# Patient Record
Sex: MALE | Race: WHITE | NOT HISPANIC OR LATINO | Employment: UNEMPLOYED | URBAN - METROPOLITAN AREA
[De-identification: names, ages, dates, MRNs, and addresses within clinical notes are randomized per-mention and may not be internally consistent; named-entity substitution may affect disease eponyms.]

---

## 2018-05-28 ENCOUNTER — CLINICAL SUPPORT (OUTPATIENT)
Dept: REHABILITATION | Facility: OTHER | Age: 48
End: 2018-05-28
Attending: PHYSICAL MEDICINE & REHABILITATION
Payer: COMMERCIAL

## 2018-05-28 VITALS
BODY MASS INDEX: 24.25 KG/M2 | SYSTOLIC BLOOD PRESSURE: 116 MMHG | DIASTOLIC BLOOD PRESSURE: 79 MMHG | WEIGHT: 169.38 LBS | HEART RATE: 70 BPM | HEIGHT: 70 IN

## 2018-05-28 DIAGNOSIS — M54.50 CHRONIC BILATERAL LOW BACK PAIN WITHOUT SCIATICA: Primary | ICD-10-CM

## 2018-05-28 DIAGNOSIS — G89.29 CHRONIC BILATERAL LOW BACK PAIN WITHOUT SCIATICA: Primary | ICD-10-CM

## 2018-05-28 PROCEDURE — 99203 OFFICE O/P NEW LOW 30 MIN: CPT | Mod: ,,, | Performed by: PHYSICAL MEDICINE & REHABILITATION

## 2018-05-28 RX ORDER — CHOLECALCIFEROL (VITAMIN D3) 25 MCG
1000 TABLET ORAL DAILY
COMMUNITY

## 2018-05-28 NOTE — PROGRESS NOTES
Health  met with patient to complete initial outcomes for the Healthy Back lumbar program.  Questions were reviewed with patient and discussed with Dr. Head. The patient will meet with Dr. Head to determine program enrollment.      Any changes to patient responses are below in red font.     HealthyBack Questionnaire  5/28/2018   Please select the location of your worst pain:  Low back   Please select the location of any additional pain: (hold down the control key, and click to select multiple responses) None of the above   Did any event trigger your pain?  No   How long has this pain been going on?  1.5 years   Please indicate how the pain is changing.  Worsening   What is the WORST level of pain that you have experienced in the last week?  5   What is the LEAST level of pain that you have experienced in the past week?  0   What can you NOT do not that you used to be able to do?  N/A   Are your limitations mainly due to your pain?  No   What are your additional complaints, if any? None   Is there ever a time during the day when your pain stops, even for a brief moment, before returning? Yes   If yes, when your pain stops, does it disappear completely? Is it totally gone? Yes   Does bending forward make your typical pain worse? No   Morning: Worse during   Afternoon: Same   Evening:  Same   Nighttime: Worse during   Standing:  Worse   Lying on stomach: Worse   Lying on back: Better   Sitting:  Better   Walking: Worse   Climbing stairs: Better   In the last seven days, have you had any changes in your bowel and/or bladder habits? No   Have all of your attempts to treat your back/leg pain resulted in failure?  No   Do you believe your doctor(s) do NOT understand how much pain you have?  No   Do you believe that you have one or more conditions that have not been diagnosed by your doctor(s)?  No   Do you believe that you deserve more understanding from others including your family than you are getting?  No    Do you feel relatively calm about how your back/leg pain has impacted your life?  Yes   Are you OK with treatment taking a very long time (even years) before you feel relief from your back/leg pain?  Yes   Do you believe that your pain has caused you to be more forgetful?  No   Do you feel that you have not received enough treatment for your pain?  Yes   Do you believe that your doctor(s) do not have the right training to treat your back/leg pain effectively?  No   Do you believe you should not be allowed to work or attend school because of your back/leg pain?  No   When did this pain begin?  1.5 years ago   Did the pain begin after an injury or an accident? No   Is the pain work related?  No   Please nessa which of the following over-the-counter medicines you take. (hold down the control key, and click to select multiple responses) None   Please nessa which of the following prescription medicines you take. (hold down the control key, and click to select multiple responses) None   Emergency department  No   Health care providers (hold down the control key, and click to select multiple responses) Family doctor   Investigations done (hold down the control key, and click to select multiple responses) None   Procedures (hold down the control key, and click to select multiple responses) None   Emergency department  No   Health care providers (hold down the control key, and click to select multiple responses) Family doctor   Investigations done (hold down the control key, and click to select multiple responses) None   Procedures (hold down the control key, and click to select multiple responses) None   Have you had any surgery on your back?  No   Please nessa what you are experiencing. (hold down the control key, and click to select multiple responses) None   First activity you would like to perform better:  Getting up from sitting   Current ability score to perform first activity: 7   Second activity you would like to  perform better: Getting up from flexed position   Current ability score to perform second activity: 7   Third activity you would like to perform better: Posture   Current ability score to perform third activity: 5   Pain intensity:  The pain comes and goes and is moderate.   Personal care (washing, dressing, etc.):  I do not normally change my way of washing or dressing even though it causes some pain.   Lifting: I can lift heavy weights without pain.   Walking: I cannot walk more than one mile without increasing pain.   Sitting: I can sit in any chair as long as I like.   Standing: Pain prevents me from standing more than one hour.   Sleeping: I get pain in bed, but it does not prevent me from sleeping well.   Social life: My social life is normal but increases the degree of pain.   Traveling: I get no pain while traveling.   Changing degree of pain: My pain is neither getting better nor worse.   Do you need any help looking after yourself? I need no help at all.   When doing household tasks, e.g., preparing food, gardening, using the video recorder, radio, telephone, or washing the car: I need no help at all.   Thinking about how easily you can get around your home and community: I get around my home and community by myself without any difficulty.   Because of your health, your relationships, e.g., your friends, partner or parents, generally: Are very close and warm   Thinking about your relationships with other people: I have plenty of friends and am never lonely.   Thinking about your health and your relationship with your  family:  My role in the family is unaffected by my health.   Thinking about your vision, including when using your glasses or contact lenses if needed: I see normally.   Thinking about your hearing, including using your hearing aid if needed: I have some difficulty hearing, or I do not hear clearly, e.g., I ask people to speak up or turn up the TV radio volume.   When you communicate with  others, e.g., talking, listening, writing, or signing: I have no trouble speaking to them or understanding what they are saying.   Thinking about how you sleep: I am able to sleep without difficulty most of the time.   Thinking about how you generally feel: I do not feel anxious, worried or depressed.   How much pain or discomfort do you experience? I have moderate pain.   Little interest or pleasure in doing things Not at all   Feeling down, depressed or hopeless Not at all   What is your occupation?  [employer]   How do you spend your leisure time? What are your hobbies? See live music  and play tennis   How do you spend your leisure time? What are your hobbies? See live music  and play tennis   What is your highest level of education? Advanced/graduate   What is your work status? Employed   How did you hear about the Healthyback program?  Patient referral   When did this pain begin?  1.5 years ago   Do you need any help looking after yourself? I need no help at all.   When doing household tasks, e.g., preparing food, gardening, using the video recorder, radio, telephone, or washing the car: I need no help at all.   Thinking about how easily you can get around your home and community: I get around my home and community by myself without any difficulty.   Because of your health, your relationships, e.g., your friends, partner or parents, generally: Are very close and warm   Thinking about your relationships with other people: I have plenty of friends and am never lonely.   Thinking about your health and your relationship with your  family:  My role in the family is unaffected by my health.   Thinking about your vision, including when using your glasses or contact lenses if needed: I see normally.   Thinking about your hearing, including using your hearing aid if needed: I have some difficulty hearing, or I do not hear clearly, e.g., I ask people to speak up or turn up the TV radio volume.   When you  communicate with others, e.g., talking, listening, writing, or signing: I have no trouble speaking to them or understanding what they are saying.   Thinking about how you sleep: I am able to sleep without difficulty most of the time.   Thinking about how you generally feel: I do not feel anxious, worried or depressed.   How much pain or discomfort do you experience? I have moderate pain.   Little interest or pleasure in doing things Not at all   Feeling down, depressed or hopeless Not at all

## 2018-05-28 NOTE — PROGRESS NOTES
Subjective:      Patient ID: Arely Haro is a 48 y.o. male.    Chief Complaint: No chief complaint on file.    Referred by: No ref. provider found     Mr Haro is a 47 yo male here  for evaluation for the healthy back lumbar program.  He has had low back pain for 1.5 years, but worse the past month.  There was no event that caused the pain, it got worse after standing and dancing at [employer].  The pain is right low back dominant, and goes to the left.  There is no numbness, tingling, burning, or weakness.  No leg pain, just tightness in quads and glutes.  The pain is intermittent ranging from 0-5/10.  The pain is an achy soreness It is worse with coming up from bending, getting up from sitting, changing position, standing, lying on stomach, walking, morning.  It is better with bending, sleeping with knees bent, lying on back, sitting, stairs, and stretching.  He has never had any treatment for his back: no chiropractor, PT, injections, or surgery.  His goals are getting up from sitting, getting up from flexed position, posture.  Pattern 1      Past Medical History:  No date: Asthma  No date: Hemochromatosis    History reviewed. No pertinent surgical history.    Review of patient's family history indicates:  Problem: Arthritis      Relation: Mother       Age of Onset: (Not Specified)   Problem: Skin cancer      Relation: Mother       Age of Onset: (Not Specified)   Problem: Cancer      Relation: Father       Age of Onset: (Not Specified)   Problem: Heart disease      Relation: Father       Age of Onset: (Not Specified)   Problem: Hypertension      Relation: Father       Age of Onset: (Not Specified)       Social History    Marital status:              Spouse name:                       Years of education:                 Number of children:               Social History Main Topics    Smoking status: Never Smoker                                                                Smokeless tobacco: Never Used                         Drug use: Yes                Types: Marijuana      Current Outpatient Prescriptions:  multivitamin (THERAGRAN) tablet, Take 1 tablet by mouth once daily., Disp: , Rfl:   vitamin D 1000 units Tab, Take 1,000 Units by mouth once daily., Disp: , Rfl:     No current facility-administered medications for this visit.       Review of patient's allergies indicates:  No Known Allergies                  Review of Systems   Constitution: Negative for weight gain and weight loss.   Cardiovascular: Negative for chest pain.   Respiratory: Negative for shortness of breath.    Musculoskeletal: Positive for back pain (right). Negative for joint pain and joint swelling.   Gastrointestinal: Negative for abdominal pain and bowel incontinence.   Genitourinary: Negative for bladder incontinence.   Neurological: Negative for numbness.           Objective:          General    Vitals reviewed.  Constitutional: He is oriented to person, place, and time. He appears well-developed and well-nourished.   HENT:   Head: Normocephalic and atraumatic.   Pulmonary/Chest: Effort normal.   Neurological: He is alert and oriented to person, place, and time.   Psychiatric: He has a normal mood and affect. His behavior is normal. Judgment and thought content normal.     General Musculoskeletal Exam   Gait: normal     Right Ankle/Foot Exam     Tests   Heel Walk: able to perform  Tiptoe Walk: able to perform    Left Ankle/Foot Exam     Tests   Heel Walk: able to perform  Tiptoe Walk: able to perform  Back (L-Spine & T-Spine) / Neck (C-Spine) Exam     Tenderness Right paramedian tenderness of the Sacrum.     Back (L-Spine & T-Spine) Range of Motion   Extension: 20   Flexion: 90   Lateral Bend Right: 20   Lateral Bend Left: 20   Rotation Right: 40   Rotation Left: 40     Spinal Sensation   Right Side Sensation  C-Spine Level: normal   L-Spine Level: normal  S-Spine Level: normal  Left Side Sensation  C-Spine Level: normal  L-Spine Level:  normal  S-Spine Level: normal    Back (L-Spine & T-Spine) Tests   Right Side Tests  Straight leg raise:      Sitting SLR: > 70 degrees      Left Side Tests  Straight leg raise:     Sitting SLR: > 70 degrees          Other He has no scoliosis .  Spinal Kyphosis:  Absent    Comments:  Neg DONNA      Muscle Strength   Right Upper Extremity   Biceps: 5/5/5   Deltoid:  5/5  Triceps:  5/5  Wrist Extension: 5/5/5   Finger Flexors:  5/5  Left Upper Extremity  Biceps: 5/5/5   Deltoid:  5/5  Triceps:  5/5  Wrist Extension: 5/5/5   Finger Flexors:  5/5  Right Lower Extremity   Hip Flexion: 5/5   Quadriceps:  5/5   Anterior tibial:  5/5/5  EHL:  5/5  Left Lower Extremity   Hip Flexion: 5/5   Quadriceps:  5/5   Anterior tibial:  5/5/5   EHL:  5/5    Reflexes     Left Side  Biceps:  2+  Triceps:  2+  Brachioradialis:  2+  Quadriceps:  2+  Achilles:  2+  Left Talamantes's Sign:  Absent  Babinski Sign:  absent    Right Side   Biceps:  2+  Triceps:  2+  Brachioradialis:  2+  Quadriceps:  2+  Achilles:  2+  Right Talamantes's Sign:  absent  Babinski Sign:  absent    Vascular Exam     Right Pulses        Carotid:                  2+    Left Pulses        Carotid:                  2+        Assessment:       Encounter Diagnosis   Name Primary?    Chronic bilateral low back pain without sciatica Yes         Plan:       Diagnoses and all orders for this visit:    Chronic bilateral low back pain without sciatica  -     Ambulatory consult to [facility]         The patient has had a history of low back pain with limitations in there activities of Daily living    The situation was discussed at length with the patient.  More than 50% of the total time of 45 minutes was spent in counseling.  We discussed different causes of back pain and different treatment options.  We discussed the importance of stretching and strengthening to help maintain normal activity.  He likes to play tennis.  We discussed posture and trying to maintain the  normal curve of the spine.  We discussed the pros and cons of further diagnostic testing, alternative treatment and Medications.     Based on the history, physical exam, and functional index, an active physical therapy program is recommended.  The goal is to restore the patients function and reduce pain.  A program of progressive resistance exercises, biomechanical, and mobility maneuvers, instructions in proper body mechanics, aerobic conditioning and HEP will be utilized. The program will continue as long as making improvements.    An assessment of patients progress will be made at each visit to document change in status.    The patient will be actively involved in there own treatment, and responsible for appointments and home program    The patient's lumbar isometric strength will be tested and they will be placed in a program of isolated strength training based on 50% of their total functional torque and advanced as clinically appropriate.      Directional preference of pain will further influence the patients active rehabilitation program    The patient was instructed there might be an initial increase in discomfort    They are enrolled with good prognosis  Pattern 1  He will be away for 5 weeks, we will get eval in before he goes and then continue

## 2018-06-01 ENCOUNTER — CLINICAL SUPPORT (OUTPATIENT)
Dept: REHABILITATION | Facility: OTHER | Age: 48
End: 2018-06-01
Attending: PHYSICAL MEDICINE & REHABILITATION
Payer: COMMERCIAL

## 2018-06-01 DIAGNOSIS — M54.50 CHRONIC BILATERAL LOW BACK PAIN WITHOUT SCIATICA: ICD-10-CM

## 2018-06-01 DIAGNOSIS — G89.29 CHRONIC BILATERAL LOW BACK PAIN WITHOUT SCIATICA: ICD-10-CM

## 2018-06-01 PROCEDURE — 97110 THERAPEUTIC EXERCISES: CPT | Performed by: PHYSICAL MEDICINE & REHABILITATION

## 2018-06-01 PROCEDURE — 97161 PT EVAL LOW COMPLEX 20 MIN: CPT | Performed by: PHYSICAL MEDICINE & REHABILITATION

## 2018-06-01 NOTE — PATIENT INSTRUCTIONS
Top 10 tips for back and neck pain    The spine is the pillar of the body, providing the foundation for the upper and lower extremities to attach.  Our spines withstand significant forces all day long.  There are many ways in which we can take care of our backs.  Here are a couple tips to help you keep your back in action.    1. Watch your posture in sitting.     Sit in chairs with back supports, and use a lumbar roll to maintain the normal curve of your low back. Ensure the height of your chair is such that your feet rest flat on the floor with your knees and hips level.  The average American sits 9 hours a day.  Do not sit longer than 1 hour without getting up to stretch or move.     2. Watch your posture in standing.   Maintain the normal curves of your spine in standing.   When standing tall, you should be able to draw a line down through your ear, shoulder, hip, and ankle.  Wear good shoes and consider using a standing desk mat if you stand a lot during work.  Take breaks from standing.    3. Lift correctly   Lift objects by using the strong muscles in your legs.  Get close to the object, bend your knees and your hips, and maintain the normal curve of your low back. Do not twist when lifting or carrying items. Think about the tasks you perform daily at work or home, and minimize lifting and carrying objects.  Use rolling carts or other strategies to reduce back strain.    4. Exercise regularly  Individuals who exercise regularly generally experience better health, reduced back pain, and less stress.  A good exercise program has a stretching component, a strengthening component, and an aerobic component.   Maintain the mobility of your spine by stretching daily. Strengthen your core and extremities several times a week.   Get regular cardiovascular exercise, 3-5/week.  Choose activities you like such as walking, swimming, dancing, or riding a bike.     5. Quit Smoking  Smoking increases the likelihood of back  pain.  It is thought that smoking reduces the blood supply to the discs between the vertebrae and this may lead to degeneration of these discs.  Talk to your Physician about quitting.  There are many smoking cessation options that may work for you.    6. Keep moving even when you have pain  Motion is lotion.   The majority of back pain is mechanical in nature, and will likely reduce with gentle movements, stretching, and walking.  As tempting as it may be to stay in bed when you are hurting, remember that you will likely feel better by getting up and gently moving and walking.  Limit bed rest.      7. Maintain a healthy diet  Try to maintain a healthy diet and weight.        8. Stay hydrated  The average adult is approximately 60 % water.  Staying hydrated is beneficial for all aspects of health.  In general, an adult should drink half of their body weight in ounces.  For example, if you weight 180 lbs, you should drink 90 ounces of water daily.     9. Get regular sleep   Ensure that you get a good nights rest on a regular basis. The discs in your spine hydrate when you lie down to sleep. Your spine needs the rest too.     10. See Your Physician    Make an appointment to see your Physician for back pain that is progressively worsening, and for back pain that is no better or worse with changing positions and activities.        Z LIE POSITION  Z Lie is a position that you can use to unload your back and assist with pain reduction.  Lie on your back and rest your calves on the seat on a chair or a bench.  Viewed from the side, you should resemble a Z.  Your therapist may suggest sliding the chair closer to you, so your knees are over your stomach.   Your therapist may also suggest a pillow under your buttock if needed.  Follow the directions from your therapist.  The goal of this position is to reduce your symptoms.    Z lie can be done in a variety of ways.  It can be done on a bed resting your legs on a light  and easy to lift chair                  Goal:  - reduce back /thigh/buttock pain with mechanical movements or positions that relieve pressure on structures in low back  - exercises done to reduce symptoms when you experience them  - exercises done to prevent symptoms, when you are feeling good      1. Exercises  A. Supine Knee-to-Chest, Bilateral    Lie on back, hands clasped behind both knees. Pull knees in toward chest until a comfortable stretch is felt in lower back and buttocks. Hold 3 seconds.  Repeat 10 times per session. Do 3 sessions per day.          C. Flexion in Kneeling    Kneel, sitting on heels, arms forward on floor. Push chest toward floor, reaching forward as far as possible. Hold 3 seconds.  Repeat 10 times per session. Do 2 sessions per day.        D. Flexors Stretch, Standing    Stand about a thigh's length from table.  edges of table. Bend knees until stretch is felt under shoulder blades in chest. Hold 3 seconds.  Repeat 10 times per session. Do 2 sessions per day.        E. Pelvic Tilt    lying supine, flatten back by tightening stomach muscles and rolling pelvis back.   Your back will flatten, your buttock will lift up.   Do not hold your breath.  Hold 3 seconds.  Repeat 10 times per set. Do 1 sets per session. Do 3 sessions per day.    [email or web address]            F.  Back Exercises: Back Press    Do this exercise on your hands and knees. Keep your knees under your hips and your hands under your shoulders. Keep your spine in a neutral position (not arched or sagging). Be sure to maintain your necks natural curve:  · Tighten your stomach and buttock muscles to press your back upward. Let your head drop slightly.  · Hold for 5 seconds. Return to starting position.  · Repeat 5 times.  Date Last Reviewed: 10/11/2015  © 9317-8976 [employer]. [address] [place] [address], [place] [address]. All rights reserved. This information is not intended as a substitute for professional  medical care. Always follow your healthcare professional's instructions.            Positioning - Z LIE  Decompression Exercise: Leg Support.  Can be done on floor or bed with legs on couch, therapy ball, chair, or auto man        Lie on back on firm surface arms turned up, out to sides, backs of hands down. Support under legs: 90/90 position. Time 10 minutes.  Surface: floor      Copyright © I. All rights reserved.

## 2018-06-01 NOTE — PLAN OF CARE
[facility] - PHYSICAL THERAPY EVALUATION     Patient   Name: Arely Haro  Clinic Number: [id number]    Arely is a 48 y.o. male evaluated on 06/01/2018.   Time In: 8:00 am  Time out: 9:30 am    Diagnosis:   Encounter Diagnosis   Name Primary?    Chronic bilateral low back pain without sciatica      Physician: Magdalena Head, *  Treatment Orders: PT Eval and Treat    Past Medical History:   Diagnosis Date    Asthma     Hemochromatosis      Current Outpatient Prescriptions   Medication Sig    multivitamin (THERAGRAN) tablet Take 1 tablet by mouth once daily.    vitamin D 1000 units Tab Take 1,000 Units by mouth once daily.     No current facility-administered medications for this visit.      Review of patient's allergies indicates:  No Known Allergies    Precautions: standard       Visit # authorized: 20  Authorization period: 12/31/18  Plan of care Expiration: sent 6/1/18    PATTERN: 1 PEN    Date of eval:  6/1/18  Assessment due:  7/1/18      HISTORY   History of Present Illness: Mr Haro is a 49 yo male here  for evaluation for the healthy back lumbar program.  He has had low back pain for 1.5 years, but worse the past month.    He has back pain most of the time in some capacity, but he has periods where it is worse.  He has pain daily, but intermittent.   It is also not really severe.  There was no event that caused the pain, it got worse after standing and dancing at [facility].  The pain is right low back dominant, and goes to the left.  There is no numbness, tingling, burning, or weakness.  No leg pain, just tightness in quads and glutes.  The pain is intermittent ranging from 0-5/10.  He has never had any treatment for his back: no chiropractor, PT, injections, or surgery.  His goals are getting up from sitting, getting up from flexed position, posture.  Pattern 1         Aggravating factors: standing too long, standing > 30-60 min, sitting during day at work, getting up from sitting,lying  on belly, child's pose  Easing Factors: in motion, feels good walking and playing tennis, lying on side  Disturbed Sleep: no      Pain Scale: Arely rates pain on a scale of 0-10 to be 6 at worst; 2 currently; 0 at best using VAS.   Pain location: low back         Pattern of pain questions:  1.  Where is your pain the worst? back  2.  Is your pain constant or intermittent? intermittent  3.  Does bending forward make your typical pain worse? Yes but standing is the worst  4.  Since the start of your back pain, has there been a change in your bowel or bladder? no  5.  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,     Red Flag Screening:   Cough  Sneeze  Strain: neg  Bladder/ bowel: neg  Falls: no  General health: good  Night pain: neg  Unexplained weight loss: neg    OBJECTIVE     POSTURE  Posture Alignment :poor posture, sits slouched with head forward  Postural examination/scapula alignment:rounded shoulders, head forward  Standing: fair lordosis  Correction of posture: better with lumbar roll    MOVEMENT LOSS    ROM Loss   Flexion minimal loss   Extension minimal loss   Side bending Right minimal loss   Side bending Left minimal loss   Rotation Right minimal loss   Rotation Left minimal loss     Lower Extremity Strength  Right LE  Left LE    Hip flexion: 5/5 Hip flexion: 5/5   Hip extension:  5/5 Hip extension: 5/5   Hip abduction: 5/5 Hip abduction: 5/5   Knee Flexion 5/5 Knee Flexion 5/5   Knee Extension 5/5 Knee Extension 5/5   Ankle dorsiflexion: 5/5 Ankle dorsiflexion: 5/5   Ankle plantarflexion: 5/5 Ankle plantarflexion: 5/5       GAIT:  amb without aid      Special Tests:    Test Name  Test Result   Prone Instability Test (+)   SI Joint Provocation Test (--)   Straight Leg Raise (--)   Neural Tension Test (--)   Crossed Straight Leg Raise (--)   Walking on toes able   Walking on heels  able     (-) alex's  (-) hip quadrant tests  Fair hip mobility          NEUROLOGICAL SCREENING     Sensory deficit: intact including saddle sensation    Reflexes:    Left Right   Patella Tendon 1+ 1+   Achilles Tendon 1+ 1+   Babinski  (--) (--)   Clonus (--) (--)     REPEATED TEST MOVEMENTS:back pain  Repeated Flexion in Standing better   Repeated Extension in Standing end range pain  no worse   Repeated Flexion in lying better   Repeated Extension in lying  end range pain  worse         Baseline Isometric Testing on Med X equipment: Testing administered by PT  [facility] 2018   Visit Number 1   VAS Pain Rating 2   Flexion in Lying 10   Lumbar Extension Seat Pad 0   Femur Restraint 4   Top Dead Center 24   Counterweight 195   Lumbar Flexion 48   Lumbar Extension 0   Lumbar Peak Torque 114   Min Torque 54   Percent From Norm 59   Ice - Z Lie (in min.) 10         Baseline IM Testing Results:   Date of testin18  ROM 0-48 deg   Max Peak Torque 114    Min Peak Torque 54    Flex/Ext Ratio 2.3/1   % below normative data -59%   Counter weight 195   Femur number 4   Seat pad 0           FOTO: Focus on Therapeutic Outcomes   Category: lumbar   % Impaired: 29%  Current Score  = CJ = at least 20% but < 40% impaired, limited or restricted  Goal at Discharge Score = CJ = at least 20% but < 40% impaired, limited or restricted    Score interpretation is as follows:     TEST SCORE  Modifier  Impairment Limitation Restriction    050  CH  0 % impaired, limited or restricted   1-950  CI  @ least 1% but less than 20% impaired, limited or restricted   10-50  CJ  @ least 20%<40% impaired, limited or restricted   20-29/50  CK  @ least 40%<60% impaired, limited or restricted   30-39/50  CL  @ least  "60% <80% impaired, limited or restricted   40-49/50  CM  @ least 80%<100% impaired limited or restricted   50/50  CN  100% impaired, limited or restricted       Treatment   Time In: 9:00 am  Time Out: 9:30 am    PT Evaluation Completed? Yes  Discussed Plan of Care with patient: Yes      Written Home Exercises Provided:   Handouts were given to the patient. Pt demo good understanding of the education provided. Arely demonstrated good return demonstration of activities.        Pt was instructed in and performed the following:   Cardiovascular exercise and therapeutic exercise to improve posture, lumbar/cervical ROM, strength, and muscular endurance as follows:     Arely received therapeutic exercises to develop/improve posture, lumbar/cervical ROM, strength and muscular endurance for 30 minutes including the following exercises:   Med ex warm up  Med ex testing  Education regarding posture  HEP performed      HEP started as follows:  -handouts given regarding back care, with information regarding posture, body mech, ergonomics, and components of good exercise program  -Patient received education regarding proper posture and body mechanics.  Amparo roll tried, recommended, and purchase information was provided.  -discussed concept of developing "tool box" or "strategies, using positions or exercises to reduce symptoms.  Discussed using these tools to reduce symptoms, and also to prevent symptoms if able.      --gave top 10 tips handouts on back and neck care and discusses sitting posture, use of lumbar roll, standing  Posture, correct lifting techniques, need to exercise and encouraged walking, drinking water, healthy diet, regular sleep    HEP as follows:  Pelvic tilts 10 reps 3/day  Flexion in lie 10 reps 3/day  QL stretch 5 reps 10 sec 2/day  Ham stretch 10 reps 5 sec before tennis  Calf stretch 10 reps               - Patient received a handout regarding anticipated muscular soreness following the isometric test " and strategies for management were reviewed with patient including stretching, using ice and scheduled rest.         Assessment   This is a 48 y.o. male referred to [facility] and presents with a medical diagnosis of   Encounter Diagnosis   Name Primary?    Chronic bilateral low back pain without sciatica     and demonstrates limitations as described below in the problem list. Pt rehab potential is Good. Pt presents with back pain, reduced back strength and reduced back mobility, poor ergonomics sitting at computer, pain with standing for events,    Pain Pattern: 1 PEN       Patient received education on the Healthy Back program, purpose of the isometric test, progression of back strengthening as well as wellness approach and systemic strengthening.  Details of the program were discussed.  Reviewed that patient should feel support/pressure from med ex restraints but no pain or discomfort and patient expressed understanding.    Based on the above history and physical examination an active physical therapy program is recommended.  Pt will continue to benefit from skilled outpatient physical therapy to address the deficits listed below in the chart, provide pt/family education and to maximize pt's level of independence in the home and community environment. .     No environmental, cultural, spiritual, developmental or education needs expressed or noted    Medical necessity is demonstrated by the following problem list.    Pt presents with the following impairments:   History  Co-morbidities and personal factors that may impact the plan of care Examination  Body Structures and Functions, activity limitations and participation restrictions that may impact the plan of care Clinical Presentation   Decision Making/ Complexity Score   Co-morbidities:     Chronic pain    Personal Factors:    Body Regions:   back  lower extremities    Body Systems:   gross symmetry  ROM  strength  gross coordinated  movement  transitions  motor control  motor learning    Activity limitations:   Learning and applying knowledge  no deficits    General Tasks and Commands  no deficits    Communication  no deficits    Mobility  using transportation (bus, train, plane, car)    Self care  looking after one's health    Domestic Life  doing house work (cleaning house, washing dishes, laundry)    Interactions/Relationships  no deficits    Life Areas  no deficits    Community and Social Life  no deficits    Participation Restrictions:   Reduced ability to travel and attend concerts     stable and uncomplicated   low         GOALS: Pt is in agreement with the following goals.    Short term goals:  6 weeks or 10 visits   1.  Pt will demonstrate increased lumbar ROM by at least 3 degrees from the initial ROM value with improvements noted in functional ROM and ability to perform ADLs  2.  Pt will demonstrate increased improved lumbar strength on testing by 10 % with improvements functionally noted with standing/walking/lifting groceries, self care  3.  Patient report a reduction in worst pain score by 1-2 points for improved tolerance during work and recreational activities  4.  Pt able to perform HEP correctly with minimal cueing or supervision for therapist      Long term goals: 13 weeks or 20 visits   1. Pt will demonstrate increased lumbar ROM by at least 6 degrees from initial ROM value, resulting in improved ability to perform functional fwd bending while standing and sitting.   2. Pt will demonstrate increased maximum isometric torque value by 30% when compared to the initial value resulting in improved ability to perform bending, lifting, and carrying activities safely, confidently.  3. Pt to demonstrate ability to independently control and reduce their pain through posture positioning and mechanical movements throughout a typical day.  4.  Patient will demonstrate improved overall function per FOTO Survey to CJ = at least 20% but <  "40% impaired, limited or restricted score or less.  5. Stand for  Hour concert with min to no pain   6. Tolerate travel well    Plan   Outpatient physical therapy 2x week for 13 weeks or 20 visits to include the following:   - Patient education  - Therapeutic exercise  - Manual therapy  - Performance testing   - Neuromuscular Re-education  - Therapeutic activity   - Modalities    Pt may be seen by PTA as part of the rehabilitation team.     Therapist: Cristina Rubio, PT  6/1/2018    "I certify the need for these services furnished under this plan of treatment and while under my care."    ____________________________________  Physician/Referring Practitioner    _______________  Date of Signature          "

## 2018-08-15 ENCOUNTER — CLINICAL SUPPORT (OUTPATIENT)
Dept: REHABILITATION | Facility: OTHER | Age: 48
End: 2018-08-15
Attending: PHYSICAL MEDICINE & REHABILITATION
Payer: COMMERCIAL

## 2018-08-15 DIAGNOSIS — G89.29 CHRONIC BILATERAL LOW BACK PAIN WITHOUT SCIATICA: ICD-10-CM

## 2018-08-15 DIAGNOSIS — M54.50 CHRONIC BILATERAL LOW BACK PAIN WITHOUT SCIATICA: ICD-10-CM

## 2018-08-15 PROCEDURE — 97110 THERAPEUTIC EXERCISES: CPT

## 2018-08-15 NOTE — PROGRESS NOTES
[facility] Treatment      Name: Arely Haro  Clinic Number: [id number]  Date of Treatment: 08/15/2018   Diagnosis:   Encounter Diagnosis   Name Primary?    Chronic bilateral low back pain without sciatica      Physician: Magdalena Head, *    Pain pattern determined: 1 PEN  Plan of care signed: 18   Time in: 7:35  Time Out: 8:35  Total Treatment time: 30  Precautions: Standard  Visit #: 2     POC due: 18 Resent 08/15/2018      Reassessment due:9/15/18    Subjective   Arely reports no change in symptoms. He has not been present at PT secondary to travelling. He performs HEP stretches every other day.      Patient reports their pain to be 3/10 on a 0-10 scale with 0 being no pain and 10 being the worst pain imaginable.    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,     Objective     Baseline IM Testing Results:   Date of testin18  ROM 0-48 deg   Max Peak Torque 114    Min Peak Torque 54    Flex/Ext Ratio 2.3/1   % below normative data -59%   Counter weight 195   Femur number 4   Seat pad 0          FOTO: Focus on Therapeutic Outcomes   Category: lumbar   % Impaired: 29%  Current Score  = CJ = at least 20% but < 40% impaired, limited or restricted  Goal at Discharge Score = CJ = at least 20% but < 40% impaired, limited or restricted    Treatment    Pt was instructed in and performed the following:     Arely received therapeutic exercises to develop/improved posture, cardiovascular endurance, muscular endurance, lumbar/cervical ROM, strength and muscular endurance for 45 minutes including the following exercises:     [facility] 8/15/2018   Visit Number 2   VAS Pain Rating 3   Recumbent Bike Seat Pos. 15   Time 10   Flexion in Lying 10    Lumbar Extension Seat Pad -   Femur Restraint -   Top Dead Center -   Counterweight -   Lumbar Flexion -   Lumbar Extension -   Lumbar Peak Torque -   Min Torque -   Percent From Norm -   Lumbar Weight 60   Repetitions 20   Rating of Perceived Exertion 3   Ice - Z Lie (in min.) 10     Pelvic tilts 10 reps 3/day  Flexion in lie 10 reps 3/day  QL stretch 5 reps 10 sec 2/day  Ham stretch 10 reps 5 sec (Supine and long sitting with contract/relax technique)   Calf stretch 10 reps     Peripheral muscle strengthening which included 1 set of 15-20 repetitions at a slow, controlled 7 second per rep pace focused on strengthening supporting musculature for improved body mechanics and functional mobility.  Pt and therapist focused on proper form during treatment to ensure optimal strengthening of each targeted muscle group.  Machines were utilized including torso rotation, leg extension, leg curl, chest press, upright row. Tricep extension, bicep curl, leg press, and hip abduction added on third visit.       Arely received the following manual therapy techniques: None       Home Exercise Program as follows:     Pelvic tilts 10 reps 3/day  Flexion in lie 10 reps 3/day  QL stretch 5 reps 10 sec 2/day  Ham stretch 10 reps 5 sec before tennis  Calf stretch 10 reps     Handouts were given to the patient. Pt demo good understanding of the education provided. Arely demonstrated good return demonstration of activities.     Lumbar roll use compliance: Yes  Additional exercises taught this treatment session:   SUpine HS stretch    Assessment     Pt presents to physical therapy for 2nd visit following initial evaluation.  Pt able to tolerate traveling without significant increases in pain with use of lumbar roll. Reviewed HEP exercises. Pt performed with moderrate verbal cues. Pt introduced to Med X lumbar dynamic exercises and peripheral resistance exercises up to upright row.  Pt tolerated Med X lumbar exercise weight of 50% peak  torque  with reported 3/10 [place] Exertion scale. Pt required mild verbal cues to maintian speed to 7 sec per rep. Pt completed all peripheral resistance exercises with no reports of increased symptoms or discomfort.     Patient is making good progress towards established goals.  Pt will continue to benefit from skilled outpatient physical therapy to address the deficits stated in the impairment chart, provide pt/family education and to maximize pt's level of independence in the home and community environment.       Pt's spiritual, cultural and educational needs considered and pt agreeable to plan of care and goals as stated below:     Medical necessity is demonstrated by the following problem list.    Pt presents with the following impairments:   History  Co-morbidities and personal factors that may impact the plan of care Examination  Body Structures and Functions, activity limitations and participation restrictions that may impact the plan of care Clinical Presentation    Decision Making/ Complexity Score   Co-morbidities:      Chronic pain     Personal Factors:     Body Regions:   back  lower extremities     Body Systems:   gross symmetry  ROM  strength  gross coordinated movement  transitions  motor control  motor learning     Activity limitations:   Learning and applying knowledge  no deficits     General Tasks and Commands  no deficits     Communication  no deficits     Mobility  using transportation (bus, train, plane, car)     Self care  looking after one's health     Domestic Life  doing house work (cleaning house, washing dishes, laundry)     Interactions/Relationships  no deficits     Life Areas  no deficits     Community and Social Life  no deficits     Participation Restrictions:   Reduced ability to travel and attend concerts       stable and uncomplicated    low            GOALS: Pt is in agreement with the following goals.     Short term goals:  6 weeks or 10 visits   1.  Pt will demonstrate increased  lumbar ROM by at least 3 degrees from the initial ROM value with improvements noted in functional ROM and ability to perform ADLs  2.  Pt will demonstrate increased improved lumbar strength on testing by 10 % with improvements functionally noted with standing/walking/lifting groceries, self care  3.  Patient report a reduction in worst pain score by 1-2 points for improved tolerance during work and recreational activities  4.  Pt able to perform HEP correctly with minimal cueing or supervision for therapist        Long term goals: 13 weeks or 20 visits   1. Pt will demonstrate increased lumbar ROM by at least 6 degrees from initial ROM value, resulting in improved ability to perform functional fwd bending while standing and sitting.   2. Pt will demonstrate increased maximum isometric torque value by 30% when compared to the initial value resulting in improved ability to perform bending, lifting, and carrying activities safely, confidently.  3. Pt to demonstrate ability to independently control and reduce their pain through posture positioning and mechanical movements throughout a typical day.  4.  Patient will demonstrate improved overall function per FOTO Survey to CJ = at least 20% but < 40% impaired, limited or restricted score or less.  5. Stand for  Hour concert with min to no pain   6. Tolerate travel well Met 8/15/18        Plan   Continue with established Plan of Care towards established PT goals.

## 2018-08-15 NOTE — PLAN OF CARE
[facility]      Name: Arely Haro  Clinic Number: [id number]  Date of Treatment: 08/15/2018   Diagnosis:   Encounter Diagnosis   Name Primary?    Chronic bilateral low back pain without sciatica      Physician: Magdalena Head, *    Pain pattern determined: 1 PEN  Plan of care signed: 18   Time in: 7:35  Time Out: 8:35  Total Treatment time: 30  Precautions: Standard  Visit #: 2     POC due: 18 Resent 08/15/2018      Reassessment due:9/15/18    Subjective   Arely reports no change in symptoms. He has not been present at PT secondary to travelling. He performs HEP stretches every other day.      Patient reports their pain to be 3/10 on a 0-10 scale with 0 being no pain and 10 being the worst pain imaginable.    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,     Objective     Baseline IM Testing Results:   Date of testin18  ROM 0-48 deg   Max Peak Torque 114    Min Peak Torque 54    Flex/Ext Ratio 2.3/1   % below normative data -59%   Counter weight 195   Femur number 4   Seat pad 0          FOTO: Focus on Therapeutic Outcomes   Category: lumbar   % Impaired: 29%  Current Score  = CJ = at least 20% but < 40% impaired, limited or restricted  Goal at Discharge Score = CJ = at least 20% but < 40% impaired, limited or restricted    Treatment    Pt was instructed in and performed the following:     Arely received therapeutic exercises to develop/improved posture, cardiovascular endurance, muscular endurance, lumbar/cervical ROM, strength and muscular endurance for 45 minutes including the following exercises:     [facility] 8/15/2018   Visit Number 2   VAS Pain Rating 3   Recumbent Bike Seat Pos. 15   Time 10   Flexion in Lying 10    Lumbar Extension Seat Pad -   Femur Restraint -   Top Dead Center -   Counterweight -   Lumbar Flexion -   Lumbar Extension -   Lumbar Peak Torque -   Min Torque -   Percent From Norm -   Lumbar Weight 60   Repetitions 20   Rating of Perceived Exertion 3   Ice - Z Lie (in min.) 10     Pelvic tilts 10 reps 3/day  Flexion in lie 10 reps 3/day  QL stretch 5 reps 10 sec 2/day  Ham stretch 10 reps 5 sec (Supine and long sitting with contract/relax technique)   Calf stretch 10 reps     Peripheral muscle strengthening which included 1 set of 15-20 repetitions at a slow, controlled 7 second per rep pace focused on strengthening supporting musculature for improved body mechanics and functional mobility.  Pt and therapist focused on proper form during treatment to ensure optimal strengthening of each targeted muscle group.  Machines were utilized including torso rotation, leg extension, leg curl, chest press, upright row. Tricep extension, bicep curl, leg press, and hip abduction added on third visit.       Arely received the following manual therapy techniques: None       Home Exercise Program as follows:     Pelvic tilts 10 reps 3/day  Flexion in lie 10 reps 3/day  QL stretch 5 reps 10 sec 2/day  Ham stretch 10 reps 5 sec before tennis  Calf stretch 10 reps     Handouts were given to the patient. Pt demo good understanding of the education provided. Arely demonstrated good return demonstration of activities.     Lumbar roll use compliance: Yes  Additional exercises taught this treatment session:   SUpine HS stretch    Assessment     Pt presents to physical therapy for 2nd visit following initial evaluation.  Pt able to tolerate traveling without significant increases in pain with use of lumbar roll. Reviewed HEP exercises. Pt performed with moderrate verbal cues. Pt introduced to Med X lumbar dynamic exercises and peripheral resistance exercises up to upright row.  Pt tolerated Med X lumbar exercise weight of 50% peak  torque  with reported 3/10 Yosi Exertion scale. Pt required mild verbal cues to maintian speed to 7 sec per rep. Pt completed all peripheral resistance exercises with no reports of increased symptoms or discomfort.     Patient is making good progress towards established goals.  Pt will continue to benefit from skilled outpatient physical therapy to address the deficits stated in the impairment chart, provide pt/family education and to maximize pt's level of independence in the home and community environment.       Pt's spiritual, cultural and educational needs considered and pt agreeable to plan of care and goals as stated below:     Medical necessity is demonstrated by the following problem list.    Pt presents with the following impairments:   History  Co-morbidities and personal factors that may impact the plan of care Examination  Body Structures and Functions, activity limitations and participation restrictions that may impact the plan of care Clinical Presentation    Decision Making/ Complexity Score   Co-morbidities:      Chronic pain     Personal Factors:     Body Regions:   back  lower extremities     Body Systems:   gross symmetry  ROM  strength  gross coordinated movement  transitions  motor control  motor learning     Activity limitations:   Learning and applying knowledge  no deficits     General Tasks and Commands  no deficits     Communication  no deficits     Mobility  using transportation (bus, train, plane, car)     Self care  looking after one's health     Domestic Life  doing house work (cleaning house, washing dishes, laundry)     Interactions/Relationships  no deficits     Life Areas  no deficits     Community and Social Life  no deficits     Participation Restrictions:   Reduced ability to travel and attend concerts       stable and uncomplicated    low            GOALS: Pt is in agreement with the following goals.     Short term goals:  6 weeks or 10 visits   1.  Pt will demonstrate increased  lumbar ROM by at least 3 degrees from the initial ROM value with improvements noted in functional ROM and ability to perform ADLs  2.  Pt will demonstrate increased improved lumbar strength on testing by 10 % with improvements functionally noted with standing/walking/lifting groceries, self care  3.  Patient report a reduction in worst pain score by 1-2 points for improved tolerance during work and recreational activities  4.  Pt able to perform HEP correctly with minimal cueing or supervision for therapist        Long term goals: 13 weeks or 20 visits   1. Pt will demonstrate increased lumbar ROM by at least 6 degrees from initial ROM value, resulting in improved ability to perform functional fwd bending while standing and sitting.   2. Pt will demonstrate increased maximum isometric torque value by 30% when compared to the initial value resulting in improved ability to perform bending, lifting, and carrying activities safely, confidently.  3. Pt to demonstrate ability to independently control and reduce their pain through posture positioning and mechanical movements throughout a typical day.  4.  Patient will demonstrate improved overall function per FOTO Survey to CJ = at least 20% but < 40% impaired, limited or restricted score or less.  5. Stand for  Hour concert with min to no pain   6. Tolerate travel well Met 8/15/18        Plan   Continue with established Plan of Care towards established PT goals.

## 2018-08-24 ENCOUNTER — CLINICAL SUPPORT (OUTPATIENT)
Dept: REHABILITATION | Facility: OTHER | Age: 48
End: 2018-08-24
Attending: PHYSICAL MEDICINE & REHABILITATION
Payer: COMMERCIAL

## 2018-08-24 DIAGNOSIS — M54.50 CHRONIC BILATERAL LOW BACK PAIN WITHOUT SCIATICA: ICD-10-CM

## 2018-08-24 DIAGNOSIS — G89.29 CHRONIC BILATERAL LOW BACK PAIN WITHOUT SCIATICA: ICD-10-CM

## 2018-08-24 PROCEDURE — 97110 THERAPEUTIC EXERCISES: CPT

## 2018-08-24 NOTE — PROGRESS NOTES
[facility]      Name: Arely Haro  Clinic Number: [id number]  Date of Treatment: 2018   Diagnosis:   Encounter Diagnosis   Name Primary?    Chronic bilateral low back pain without sciatica      Physician: Magdalena Head, *    Pain pattern determined: Nancy PEN  Plan of care signed: 18   Time in: 2:30  Time Out: 3:30  Total Treatment time: 40  Precautions: Standard  Visit #: 3     POC due: 18 Resent 08/15/2018    Reassessment due:9/15/18    Subjective   Arely reports no change in symptoms. He continues to report a low grade aching of his back. He was not sore after last session.     Patient reports their pain to be 3/10 on a 0-10 scale with 0 being no pain and 10 being the worst pain imaginable.    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,     Objective     Baseline IM Testing Results:   Date of testin18  ROM 0-48 deg   Max Peak Torque 114    Min Peak Torque 54    Flex/Ext Ratio 2.3/1   % below normative data -59%   Counter weight 195   Femur number 4   Seat pad 0          FOTO: Focus on Therapeutic Outcomes   Category: lumbar   % Impaired: 29%  Current Score  = CJ = at least 20% but < 40% impaired, limited or restricted  Goal at Discharge Score = CJ = at least 20% but < 40% impaired, limited or restricted    Treatment    Pt was instructed in and performed the following:     Arely received therapeutic exercises to develop/improved posture, cardiovascular endurance, muscular endurance, lumbar/cervical ROM, strength and muscular endurance for 45 minutes including the following exercises:     [facility] 2018   Visit Number 3   VAS Pain Rating 2   Recumbent Bike Seat Pos. 15   Time 10   Flexion in Lying 10   Lumbar  Extension Seat Pad -   Femur Restraint -   Top Dead Center -   Counterweight -   Lumbar Flexion -   Lumbar Extension -   Lumbar Peak Torque -   Min Torque -   Percent From Norm -   Lumbar Weight 60   Repetitions 20   Rating of Perceived Exertion 3   Ice - Z Lie (in min.) 10       Pelvic tilts 10 reps 3/day  Flexion in lie 10 reps 3/day  QL stretch 5 reps 10 sec 2/day  Ham stretch 10 reps 5 sec (Supine and long sitting with contract/relax technique)   Calf stretch 10 reps     Peripheral muscle strengthening which included 1 set of 15-20 repetitions at a slow, controlled 7 second per rep pace focused on strengthening supporting musculature for improved body mechanics and functional mobility.  Pt and therapist focused on proper form during treatment to ensure optimal strengthening of each targeted muscle group.  Machines were utilized including torso rotation, leg extension, leg curl, chest press, upright row. Tricep extension, bicep curl, leg press, and hip abduction added on third visit.       Arely received the following manual therapy techniques: None       Home Exercise Program as follows:     Pelvic tilts 10 reps 3/day  Flexion in lie 10 reps 3/day  QL stretch 5 reps 10 sec 2/day  Ham stretch 10 reps 5 sec before tennis  Calf stretch 10 reps     Handouts were given to the patient. Pt demo good understanding of the education provided. Arely demonstrated good return demonstration of activities.     Lumbar roll use compliance: Yes  Additional exercises taught this treatment session:   None, HEP review     Assessment     Pt presents with mild low back pain which did not change significantly throughout treatment session.  Pt able to tolerate traveling without significant increases in pain with use of lumbar roll. Reviewed HEP exercises with minimal v/c.  Pt performed with moderrate verbal cues. Pt reports TAD does not give as intense of a stretch as it did when first starting. Pt tolerated Med X lumbar exercise at  previous exercise  with reported 3/10 Yosi Exertion scale. Pt reported mildly increased fatigue when attempting to keep reps at 10-12 second reps. Pt completed all peripheral resistance exercises with no reports of increased symptoms or discomfort.     Patient is making good progress towards established goals.  Pt will continue to benefit from skilled outpatient physical therapy to address the deficits stated in the impairment chart, provide pt/family education and to maximize pt's level of independence in the home and community environment.       Pt's spiritual, cultural and educational needs considered and pt agreeable to plan of care and goals as stated below:     Medical necessity is demonstrated by the following problem list.    Pt presents with the following impairments:   History  Co-morbidities and personal factors that may impact the plan of care Examination  Body Structures and Functions, activity limitations and participation restrictions that may impact the plan of care Clinical Presentation    Decision Making/ Complexity Score   Co-morbidities:      Chronic pain     Personal Factors:     Body Regions:   back  lower extremities     Body Systems:   gross symmetry  ROM  strength  gross coordinated movement  transitions  motor control  motor learning     Activity limitations:   Learning and applying knowledge  no deficits     General Tasks and Commands  no deficits     Communication  no deficits     Mobility  using transportation (bus, train, plane, car)     Self care  looking after one's health     Domestic Life  doing house work (cleaning house, washing dishes, laundry)     Interactions/Relationships  no deficits     Life Areas  no deficits     Community and Social Life  no deficits     Participation Restrictions:   Reduced ability to travel and attend concerts       stable and uncomplicated    low            GOALS: Pt is in agreement with the following goals.     Short term goals:  6 weeks or 10 visits    1.  Pt will demonstrate increased lumbar ROM by at least 3 degrees from the initial ROM value with improvements noted in functional ROM and ability to perform ADLs  2.  Pt will demonstrate increased improved lumbar strength on testing by 10 % with improvements functionally noted with standing/walking/lifting groceries, self care  3.  Patient report a reduction in worst pain score by 1-2 points for improved tolerance during work and recreational activities  4.  Pt able to perform HEP correctly with minimal cueing or supervision for therapist        Long term goals: 13 weeks or 20 visits   1. Pt will demonstrate increased lumbar ROM by at least 6 degrees from initial ROM value, resulting in improved ability to perform functional fwd bending while standing and sitting.   2. Pt will demonstrate increased maximum isometric torque value by 30% when compared to the initial value resulting in improved ability to perform bending, lifting, and carrying activities safely, confidently.  3. Pt to demonstrate ability to independently control and reduce their pain through posture positioning and mechanical movements throughout a typical day.  4.  Patient will demonstrate improved overall function per FOTO Survey to CJ = at least 20% but < 40% impaired, limited or restricted score or less.  5. Stand for  Hour concert with min to no pain   6. Tolerate travel well Met 8/15/18        Plan   Continue with established Plan of Care towards established PT goals.

## 2018-09-21 ENCOUNTER — CLINICAL SUPPORT (OUTPATIENT)
Dept: REHABILITATION | Facility: OTHER | Age: 48
End: 2018-09-21
Attending: PHYSICAL MEDICINE & REHABILITATION
Payer: COMMERCIAL

## 2018-09-21 DIAGNOSIS — G89.29 CHRONIC BILATERAL LOW BACK PAIN WITHOUT SCIATICA: ICD-10-CM

## 2018-09-21 DIAGNOSIS — M54.50 CHRONIC BILATERAL LOW BACK PAIN WITHOUT SCIATICA: ICD-10-CM

## 2018-09-21 PROCEDURE — 97110 THERAPEUTIC EXERCISES: CPT

## 2018-09-21 NOTE — PROGRESS NOTES
[facility]      Name: Arely Haro  Clinic Number: [id number]  Date of Treatment: 2018   Diagnosis:   Encounter Diagnosis   Name Primary?    Chronic bilateral low back pain without sciatica      Physician: Magdalena Head, *    Pain pattern determined: 1 PEN  Plan of care signed: 18   Time in: 7:00  Time Out: 8:00  Total Treatment time: 50  Precautions: Standard  Visit #: 4     POC due: 18 Resent 08/15/2018    Reassessment due:9/15/18    Subjective   Arely reports the HEP is starting to help.  He continues to report a low grade aching of his back. He was not sore after last session.     Patient reports their pain to be 1/10 on a 0-10 scale with 0 being no pain and 10 being the worst pain imaginable.    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,     Objective     Baseline IM Testing Results:   Date of testin18  ROM 0-48 deg   Max Peak Torque 114    Min Peak Torque 54    Flex/Ext Ratio 2.3/1   % below normative data -59%   Counter weight 195   Femur number 4   Seat pad 0          FOTO: Focus on Therapeutic Outcomes   Category: lumbar   % Impaired: 29%  Current Score  = CJ = at least 20% but < 40% impaired, limited or restricted  Goal at Discharge Score = CJ = at least 20% but < 40% impaired, limited or restricted    Treatment    Pt was instructed in and performed the following:     Arely received therapeutic exercises to develop/improved posture, cardiovascular endurance, muscular endurance, lumbar/cervical ROM, strength and muscular endurance for 45 minutes including the following exercises:       [facility] 2018   Visit Number 4   VAS Pain Rating 1   Recumbent Bike Seat Pos. 15   Time 10   Flexion in Lying 10    Lumbar Extension Seat Pad -   Femur Restraint -   Top Dead Center -   Counterweight -   Lumbar Flexion -   Lumbar Extension -   Lumbar Peak Torque -   Min Torque -   Test Percent Below Normative Data -   Lumbar Weight 65   Repetitions 20   Rating of Perceived Exertion 3   Ice - Z Lie (in min.) 10       Pelvic tilts 10 reps 3/day  Flexion in lie 10 reps 3/day  QL stretch 5 reps 10 sec 2/day  Ham stretch 10 reps 5 sec (Supine and long sitting with contract/relax technique)   Calf stretch 10 reps     Peripheral muscle strengthening which included 1 set of 15-20 repetitions at a slow, controlled 7 second per rep pace focused on strengthening supporting musculature for improved body mechanics and functional mobility.  Pt and therapist focused on proper form during treatment to ensure optimal strengthening of each targeted muscle group.  Machines were utilized including torso rotation, leg extension, leg curl, chest press, upright row. Tricep extension, bicep curl, leg press, and hip abduction added on third visit.       Arely received the following manual therapy techniques: None       Home Exercise Program as follows:     Pelvic tilts 10 reps 3/day  Flexion in lie 10 reps 3/day  QL stretch 5 reps 10 sec 2/day  Ham stretch 10 reps 5 sec before tennis  Calf stretch 10 reps     Handouts were given to the patient. Pt demo good understanding of the education provided. Arely demonstrated good return demonstration of activities.     Lumbar roll use compliance: Yes  Additional exercises taught this treatment session:   Seated trunk flexion 10x  Standing pelvic tilts 10x    Assessment     Pt presents with mild discomfort today that did decrease with session.  Introduced seated trunk and standing pelvic tilt with foot propped up due to he gets LBP with prolonged standing.Reviewed HEP exercises with minimal v/c. Pt tolerated Med X lumbar exercise at previous exercise  with reported 3/10 Yosi Exertion scale.  Pt completed all  peripheral resistance exercises with no reports of increased symptoms or discomfort.     Patient is making good progress towards established goals.  Pt will continue to benefit from skilled outpatient physical therapy to address the deficits stated in the impairment chart, provide pt/family education and to maximize pt's level of independence in the home and community environment.       Pt's spiritual, cultural and educational needs considered and pt agreeable to plan of care and goals as stated below:     Medical necessity is demonstrated by the following problem list.    Pt presents with the following impairments:   History  Co-morbidities and personal factors that may impact the plan of care Examination  Body Structures and Functions, activity limitations and participation restrictions that may impact the plan of care Clinical Presentation    Decision Making/ Complexity Score   Co-morbidities:      Chronic pain     Personal Factors:     Body Regions:   back  lower extremities     Body Systems:   gross symmetry  ROM  strength  gross coordinated movement  transitions  motor control  motor learning     Activity limitations:   Learning and applying knowledge  no deficits     General Tasks and Commands  no deficits     Communication  no deficits     Mobility  using transportation (bus, train, plane, car)     Self care  looking after one's health     Domestic Life  doing house work (cleaning house, washing dishes, laundry)     Interactions/Relationships  no deficits     Life Areas  no deficits     Community and Social Life  no deficits     Participation Restrictions:   Reduced ability to travel and attend concerts       stable and uncomplicated    low            GOALS: Pt is in agreement with the following goals.     Short term goals:  6 weeks or 10 visits   1.  Pt will demonstrate increased lumbar ROM by at least 3 degrees from the initial ROM value with improvements noted in functional ROM and ability to perform  ADLs  2.  Pt will demonstrate increased improved lumbar strength on testing by 10 % with improvements functionally noted with standing/walking/lifting groceries, self care  3.  Patient report a reduction in worst pain score by 1-2 points for improved tolerance during work and recreational activities  4.  Pt able to perform HEP correctly with minimal cueing or supervision for therapist        Long term goals: 13 weeks or 20 visits   1. Pt will demonstrate increased lumbar ROM by at least 6 degrees from initial ROM value, resulting in improved ability to perform functional fwd bending while standing and sitting.   2. Pt will demonstrate increased maximum isometric torque value by 30% when compared to the initial value resulting in improved ability to perform bending, lifting, and carrying activities safely, confidently.  3. Pt to demonstrate ability to independently control and reduce their pain through posture positioning and mechanical movements throughout a typical day.  4.  Patient will demonstrate improved overall function per FOTO Survey to CJ = at least 20% but < 40% impaired, limited or restricted score or less.  5. Stand for  Hour concert with min to no pain   6. Tolerate travel well Met 8/15/18        Plan   Continue with established Plan of Care towards established PT goals.

## 2018-09-24 ENCOUNTER — CLINICAL SUPPORT (OUTPATIENT)
Dept: REHABILITATION | Facility: OTHER | Age: 48
End: 2018-09-24
Attending: PHYSICAL MEDICINE & REHABILITATION
Payer: COMMERCIAL

## 2018-09-24 DIAGNOSIS — G89.29 CHRONIC BILATERAL LOW BACK PAIN WITHOUT SCIATICA: ICD-10-CM

## 2018-09-24 DIAGNOSIS — M54.50 CHRONIC BILATERAL LOW BACK PAIN WITHOUT SCIATICA: ICD-10-CM

## 2018-09-24 PROCEDURE — 97110 THERAPEUTIC EXERCISES: CPT

## 2018-09-24 NOTE — PROGRESS NOTES
[facility]      Name: Arely Haro  Clinic Number: [id number]  Date of Treatment: 2018   Diagnosis:   Encounter Diagnosis   Name Primary?    Chronic bilateral low back pain without sciatica      Physician: Magdalena Head, *    Pain pattern determined: 1 PEN  Plan of care signed: 18   Time in: 2:05  Time Out: 3:00  Total Treatment time: 45  Precautions: Standard  Visit #: 5  (attempt ROM Increase next session)      POC due: 18 Resent 08/15/2018    Reassessment due: 10/24/18    Subjective   Arely reports moderate-sever increased low back pain with prolong standing on boat this weekend. He finds it took about 4 hours before he started having significant increased pain. He was not sore after last session.     Patient reports their pain to be 4/10 on a 0-10 scale with 0 being no pain and 10 being the worst pain imaginable.    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,     Objective     Baseline IM Testing Results:   Date of testin18  ROM 0-48 deg   Max Peak Torque 114    Min Peak Torque 54    Flex/Ext Ratio 2.3/1   % below normative data -59%   Counter weight 195   Femur number 4   Seat pad 0          FOTO: Focus on Therapeutic Outcomes   Category: lumbar   % Impaired: 29%  Current Score  = CJ = at least 20% but < 40% impaired, limited or restricted  Goal at Discharge Score = CJ = at least 20% but < 40% impaired, limited or restricted    Treatment    Pt was instructed in and performed the following:     Arely received therapeutic exercises to develop/improved posture, cardiovascular endurance, muscular endurance, lumbar/cervical ROM, strength and muscular endurance for 45 minutes including the following exercises:      [facility] 9/24/2018   Visit Number 5   VAS Pain Rating 4   Recumbent Bike Seat Pos. 15   Time 10   Flexion in Lying 10   Lumbar Extension Seat Pad -   Femur Restraint -   Top Dead Center -   Counterweight -   Lumbar Flexion -   Lumbar Extension -   Lumbar Peak Torque -   Min Torque -   Test Percent Below Normative Data -   Lumbar Weight 68   Repetitions 15   Rating of Perceived Exertion 5   Ice - Z Lie (in min.) 10       Pelvic tilts 10 reps 3/day  Flexion in lie 10 reps 3/day  QL stretch 5 reps 10 sec 2/day NT  Ham stretch 10 reps 5 sec (Supine and long sitting with contract/relax technique)   Calf stretch 10 reps     Peripheral muscle strengthening which included 1 set of 15-20 repetitions at a slow, controlled 7 second per rep pace focused on strengthening supporting musculature for improved body mechanics and functional mobility.  Pt and therapist focused on proper form during treatment to ensure optimal strengthening of each targeted muscle group.  Machines were utilized including torso rotation, leg extension, leg curl, chest press, upright row. Tricep extension, bicep curl, leg press, and hip abduction added on third visit.       Arely received the following manual therapy techniques: None       Home Exercise Program as follows:     Pelvic tilts 10 reps 3/day  Flexion in lie 10 reps 3/day  QL stretch 5 reps 10 sec 2/day  Ham stretch 10 reps 5 sec before tennis  Calf stretch 10 reps  Seated trunk flexion 10x  Standing pelvic tilts 10x     Handouts were given to the patient. Pt demo good understanding of the education provided. Arely demonstrated good return demonstration of activities.     Lumbar roll use compliance: Yes  Additional exercises taught this treatment session:       Assessment     Pt presents with moderate discomfort today that did decrease with session. Reviewed HEP exercises with minimal v/c. Attempted to increase ROM but pt unable to tolerate increased range per LBP soreness  today. Pt tolerated Med X lumbar exercise with 5% weight increase  with reported 5/10 Yosi Exertion scale.  Pt completed all peripheral resistance exercises with no reports of increased symptoms or discomfort.     Patient is making good progress towards established goals.  Pt will continue to benefit from skilled outpatient physical therapy to address the deficits stated in the impairment chart, provide pt/family education and to maximize pt's level of independence in the home and community environment.       Pt's spiritual, cultural and educational needs considered and pt agreeable to plan of care and goals as stated below:     Medical necessity is demonstrated by the following problem list.    Pt presents with the following impairments:   History  Co-morbidities and personal factors that may impact the plan of care Examination  Body Structures and Functions, activity limitations and participation restrictions that may impact the plan of care Clinical Presentation    Decision Making/ Complexity Score   Co-morbidities:      Chronic pain     Personal Factors:     Body Regions:   back  lower extremities     Body Systems:   gross symmetry  ROM  strength  gross coordinated movement  transitions  motor control  motor learning     Activity limitations:   Learning and applying knowledge  no deficits     General Tasks and Commands  no deficits     Communication  no deficits     Mobility  using transportation (bus, train, plane, car)     Self care  looking after one's health     Domestic Life  doing house work (cleaning house, washing dishes, laundry)     Interactions/Relationships  no deficits     Life Areas  no deficits     Community and Social Life  no deficits     Participation Restrictions:   Reduced ability to travel and attend concerts       stable and uncomplicated    low            GOALS: Pt is in agreement with the following goals.     Short term goals:  6 weeks or 10 visits   1.  Pt will demonstrate increased  lumbar ROM by at least 3 degrees from the initial ROM value with improvements noted in functional ROM and ability to perform ADLs Not met, progressing.   2.  Pt will demonstrate increased improved lumbar strength on testing by 10 % with improvements functionally noted with standing/walking/lifting groceries, self care  Not met, progressing.   3.  Patient report a reduction in worst pain score by 1-2 points for improved tolerance during work and recreational activities  Not met, progressing.   4.  Pt able to perform HEP correctly with minimal cueing or supervision for therapist Not met, progressing.         Long term goals: 13 weeks or 20 visits   1. Pt will demonstrate increased lumbar ROM by at least 6 degrees from initial ROM value, resulting in improved ability to perform functional fwd bending while standing and sitting.  Not met, progressing.   2. Pt will demonstrate increased maximum isometric torque value by 30% when compared to the initial value resulting in improved ability to perform bending, lifting, and carrying activities safely, confidently. Not met, progressing.   3. Pt to demonstrate ability to independently control and reduce their pain through posture positioning and mechanical movements throughout a typical day. Not met, progressing.   4.  Patient will demonstrate improved overall function per FOTO Survey to CJ = at least 20% but < 40% impaired, limited or restricted score or less. Not met, progressing.   5. Stand for  Hour concert with min to no pain  Not met, progressing.   6. Tolerate travel well Met 8/15/18        Plan   Continue with established Plan of Care towards established PT goals.

## 2018-09-26 ENCOUNTER — CLINICAL SUPPORT (OUTPATIENT)
Dept: REHABILITATION | Facility: OTHER | Age: 48
End: 2018-09-26
Attending: PHYSICAL MEDICINE & REHABILITATION
Payer: COMMERCIAL

## 2018-09-26 DIAGNOSIS — M54.50 CHRONIC BILATERAL LOW BACK PAIN WITHOUT SCIATICA: ICD-10-CM

## 2018-09-26 DIAGNOSIS — G89.29 CHRONIC BILATERAL LOW BACK PAIN WITHOUT SCIATICA: ICD-10-CM

## 2018-09-26 PROCEDURE — 97110 THERAPEUTIC EXERCISES: CPT

## 2018-09-26 NOTE — PROGRESS NOTES
[facility] Physical Therapy Treatment      Name: Arely Haro  Clinic Number: [id number]  Date of Treatment: 2018   Diagnosis:   Encounter Diagnosis   Name Primary?    Chronic bilateral low back pain without sciatica      Physician: Magdalena Head, *    Pain pattern determined: 1 PEN  Plan of care signed: 18   Time in: 2:05  Time Out: 3:00  Total Treatment time: 45  Precautions: Standard  Visit #: 6     POC due: 18 Resent 08/15/2018    Reassessment due: 10/27/18    Subjective   Arely reports moderate-sever increased low back pain with prolong standing on boat this weekend. He finds he is still sore but better today.       Patient reports their pain to be 2/10 on a 0-10 scale with 0 being no pain and 10 being the worst pain imaginable.    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,     Objective     Baseline IM Testing Results:   Date of testin18  ROM 0-48 deg (increased to 54-0)    Max Peak Torque 114    Min Peak Torque 54    Flex/Ext Ratio 2.3/1   % below normative data -59%   Counter weight 195   Femur number 4   Seat pad 0          FOTO: Focus on Therapeutic Outcomes   Category: lumbar   % Impaired: 29%  Current Score  = CJ = at least 20% but < 40% impaired, limited or restricted  Goal at Discharge Score = CJ = at least 20% but < 40% impaired, limited or restricted    Treatment    Pt was instructed in and performed the following:     Arely received therapeutic exercises to develop/improved posture, cardiovascular endurance, muscular endurance, lumbar/cervical ROM, strength and muscular endurance for 45 minutes including the following exercises:     [facility] 2018   Visit Number 6   VAS Pain Rating 2   Recumbent Bike Seat Pos. 15    Time 10   Flexion in Lying 10   Lumbar Extension Seat Pad -   Femur Restraint -   Top Dead Center -   Counterweight -   Lumbar Flexion -   Lumbar Extension -   Lumbar Peak Torque -   Min Torque -   Test Percent Below Normative Data -   Lumbar Weight 68   Repetitions 15   Rating of Perceived Exertion 4   Ice - Z Lie (in min.) 10     Pelvic tilts 10 reps 3/day  PPT + bridges 10x   Flexion in lie 10 reps 3/day  Side bridge 10x     Ham stretch 10 reps 5 sec (Supine and long sitting with contract/relax technique)        Peripheral muscle strengthening which included 1 set of 15-20 repetitions at a slow, controlled 7 second per rep pace focused on strengthening supporting musculature for improved body mechanics and functional mobility.  Pt and therapist focused on proper form during treatment to ensure optimal strengthening of each targeted muscle group.  Machines were utilized including torso rotation, leg extension, leg curl, chest press, upright row. Tricep extension, bicep curl, leg press, and hip abduction added on third visit.       Arely received the following manual therapy techniques: None       Home Exercise Program as follows:     Pelvic tilts 10 reps 3/day  Flexion in lie 10 reps 3/day  QL stretch 5 reps 10 sec 2/day  Ham stretch 10 reps 5 sec before tennis  Calf stretch 10 reps  Seated trunk flexion 10x  Standing pelvic tilts 10x   Side bridge 10x     Handouts were given to the patient. Pt demo good understanding of the education provided. Arely demonstrated good return demonstration of activities.     Lumbar roll use compliance: Yes  Additional exercises taught this treatment session:   Side bridge 10x     Assessment     Pt presents with moderate discomfort today that did decrease with session. Reviewed HEP exercises with minimal v/c. P table to tolerate increased ROM to 54-0 at previous weight  with reported 4/10 Yosi Exertion scale.  Pt completed all peripheral resistance exercises with no reports of  increased symptoms or discomfort.     Patient is making good progress towards established goals.  Pt will continue to benefit from skilled outpatient physical therapy to address the deficits stated in the impairment chart, provide pt/family education and to maximize pt's level of independence in the home and community environment.       Pt's spiritual, cultural and educational needs considered and pt agreeable to plan of care and goals as stated below:     Medical necessity is demonstrated by the following problem list.    Pt presents with the following impairments:   History  Co-morbidities and personal factors that may impact the plan of care Examination  Body Structures and Functions, activity limitations and participation restrictions that may impact the plan of care Clinical Presentation    Decision Making/ Complexity Score   Co-morbidities:      Chronic pain     Personal Factors:     Body Regions:   back  lower extremities     Body Systems:   gross symmetry  ROM  strength  gross coordinated movement  transitions  motor control  motor learning     Activity limitations:   Learning and applying knowledge  no deficits     General Tasks and Commands  no deficits     Communication  no deficits     Mobility  using transportation (bus, train, plane, car)     Self care  looking after one's health     Domestic Life  doing house work (cleaning house, washing dishes, laundry)     Interactions/Relationships  no deficits     Life Areas  no deficits     Community and Social Life  no deficits     Participation Restrictions:   Reduced ability to travel and attend concerts       stable and uncomplicated    low            GOALS: Pt is in agreement with the following goals.     Short term goals:  6 weeks or 10 visits   1.  Pt will demonstrate increased lumbar ROM by at least 3 degrees from the initial ROM value with improvements noted in functional ROM and ability to perform ADLs  Met 9/27/18  2.  Pt will demonstrate increased  improved lumbar strength on testing by 10 % with improvements functionally noted with standing/walking/lifting groceries, self care  Not met, progressing.   3.  Patient report a reduction in worst pain score by 1-2 points for improved tolerance during work and recreational activities  Not met, progressing.   4.  Pt able to perform HEP correctly with minimal cueing or supervision for therapist Not met, progressing.         Long term goals: 13 weeks or 20 visits   1. Pt will demonstrate increased lumbar ROM by at least 6 degrees from initial ROM value, resulting in improved ability to perform functional fwd bending while standing and sitting.  Met 9/27/18  2. Pt will demonstrate increased maximum isometric torque value by 30% when compared to the initial value resulting in improved ability to perform bending, lifting, and carrying activities safely, confidently. Not met, progressing.   3. Pt to demonstrate ability to independently control and reduce their pain through posture positioning and mechanical movements throughout a typical day. Not met, progressing.   4.  Patient will demonstrate improved overall function per FOTO Survey to CJ = at least 20% but < 40% impaired, limited or restricted score or less. Not met, progressing.   5. Stand for  Hour concert with min to no pain  Not met, progressing.   6. Tolerate travel well Met 8/15/18        Plan   Continue with established Plan of Care towards established PT goals.

## 2018-10-03 ENCOUNTER — CLINICAL SUPPORT (OUTPATIENT)
Dept: REHABILITATION | Facility: OTHER | Age: 48
End: 2018-10-03
Attending: PHYSICAL MEDICINE & REHABILITATION
Payer: COMMERCIAL

## 2018-10-03 DIAGNOSIS — M54.50 CHRONIC BILATERAL LOW BACK PAIN WITHOUT SCIATICA: ICD-10-CM

## 2018-10-03 DIAGNOSIS — G89.29 CHRONIC BILATERAL LOW BACK PAIN WITHOUT SCIATICA: ICD-10-CM

## 2018-10-03 PROCEDURE — 97110 THERAPEUTIC EXERCISES: CPT

## 2018-10-03 NOTE — PROGRESS NOTES
[facility] Physical Therapy Treatment      Name: Arely Haro  Clinic Number: [id number]  Date of Treatment: 10/03/2018   Diagnosis:   Encounter Diagnosis   Name Primary?    Chronic bilateral low back pain without sciatica      Physician: Magdalena Head, *    Pain pattern determined: 1 PEN  Plan of care signed: 18   Time in: 7:00  Time Out: 8:00  Total Treatment time: 45  Precautions: Standard  Visit #: 7     POC due: 18 Resent 08/15/2018    Reassessment due: 10/27/18    Subjective   Arely reports no low back pain but has some general soreness of the lumbar region. He was not too sore after last session. He does his HEP every other day.       Patient reports their pain to be 0/10 on a 0-10 scale with 0 being no pain and 10 being the worst pain imaginable.    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,     Objective     Baseline IM Testing Results:   Date of testin18  ROM 0-48 deg (increased to 54-0)    Max Peak Torque 114    Min Peak Torque 54    Flex/Ext Ratio 2.3/1   % below normative data -59%   Counter weight 195   Femur number 4   Seat pad 0          FOTO: Focus on Therapeutic Outcomes   Category: lumbar   % Impaired: 29%  Current Score  = CJ = at least 20% but < 40% impaired, limited or restricted  Goal at Discharge Score = CJ = at least 20% but < 40% impaired, limited or restricted    Treatment    Pt was instructed in and performed the following:     Arely received therapeutic exercises to develop/improved posture, cardiovascular endurance, muscular endurance, lumbar/cervical ROM, strength and muscular endurance for 45 minutes including the following exercises:     [facility] 10/3/2018   Visit Number 7   VAS Pain Rating 0   Recumbent Bike  Seat Pos. 15   Time 10   Flexion in Lying 10   Lumbar Extension Seat Pad -   Femur Restraint -   Top Dead Center -   Counterweight -   Lumbar Flexion -   Lumbar Extension -   Lumbar Peak Torque -   Min Torque -   Test Percent Below Normative Data -   Lumbar Weight 68   Repetitions 17   Rating of Perceived Exertion 6   Ice - Z Lie (in min.) 10     Pelvic tilts 10 reps 3/day  PPT + bridges 10x   Flexion in lie 10 reps 3/day  Side bridge  5x 10 s/h    Ham stretch 10 reps 5 sec (Supine and long sitting with contract/relax technique)        Peripheral muscle strengthening which included 1 set of 15-20 repetitions at a slow, controlled 7 second per rep pace focused on strengthening supporting musculature for improved body mechanics and functional mobility.  Pt and therapist focused on proper form during treatment to ensure optimal strengthening of each targeted muscle group.  Machines were utilized including torso rotation, leg extension, leg curl, chest press, upright row. Tricep extension, bicep curl, leg press, and hip abduction added on third visit.       Arely received the following manual therapy techniques: None       Home Exercise Program as follows:     Pelvic tilts 10 reps 3/day  Flexion in lie 10 reps 3/day  QL stretch 5 reps 10 sec 2/day  Ham stretch 10 reps 5 sec before tennis  Calf stretch 10 reps  Seated trunk flexion 10x  Standing pelvic tilts 10x   Side bridge 10x     Handouts were given to the patient. Pt demo good understanding of the education provided. Arely demonstrated good return demonstration of activities.     Lumbar roll use compliance: Yes  Additional exercises taught this treatment session:   None, HEP review     Assessment     Pt presents with moderate discomfort today that did decrease with session. Reviewed HEP exercises with minimal v/c. P table to tolerate previous weight  with reported 6/10 Yosi Exertion scale.  Pt completed all peripheral resistance exercises with no reports of  increased symptoms or discomfort. Plan to add hip strengthening such as clamshell and/or standing hip extensions to improve tolerance to prolonged standing.     Patient is making good progress towards established goals.  Pt will continue to benefit from skilled outpatient physical therapy to address the deficits stated in the impairment chart, provide pt/family education and to maximize pt's level of independence in the home and community environment.       Pt's spiritual, cultural and educational needs considered and pt agreeable to plan of care and goals as stated below:     Medical necessity is demonstrated by the following problem list.    Pt presents with the following impairments:   History  Co-morbidities and personal factors that may impact the plan of care Examination  Body Structures and Functions, activity limitations and participation restrictions that may impact the plan of care Clinical Presentation    Decision Making/ Complexity Score   Co-morbidities:      Chronic pain     Personal Factors:     Body Regions:   back  lower extremities     Body Systems:   gross symmetry  ROM  strength  gross coordinated movement  transitions  motor control  motor learning     Activity limitations:   Learning and applying knowledge  no deficits     General Tasks and Commands  no deficits     Communication  no deficits     Mobility  using transportation (bus, train, plane, car)     Self care  looking after one's health     Domestic Life  doing house work (cleaning house, washing dishes, laundry)     Interactions/Relationships  no deficits     Life Areas  no deficits     Community and Social Life  no deficits     Participation Restrictions:   Reduced ability to travel and attend concerts       stable and uncomplicated    low            GOALS: Pt is in agreement with the following goals.     Short term goals:  6 weeks or 10 visits   1.  Pt will demonstrate increased lumbar ROM by at least 3 degrees from the initial ROM  value with improvements noted in functional ROM and ability to perform ADLs  Met 9/27/18  2.  Pt will demonstrate increased improved lumbar strength on testing by 10 % with improvements functionally noted with standing/walking/lifting groceries, self care  Not met, progressing.   3.  Patient report a reduction in worst pain score by 1-2 points for improved tolerance during work and recreational activities  Not met, progressing.   4.  Pt able to perform HEP correctly with minimal cueing or supervision for therapist Not met, progressing.         Long term goals: 13 weeks or 20 visits   1. Pt will demonstrate increased lumbar ROM by at least 6 degrees from initial ROM value, resulting in improved ability to perform functional fwd bending while standing and sitting.  Met 9/27/18  2. Pt will demonstrate increased maximum isometric torque value by 30% when compared to the initial value resulting in improved ability to perform bending, lifting, and carrying activities safely, confidently. Not met, progressing.   3. Pt to demonstrate ability to independently control and reduce their pain through posture positioning and mechanical movements throughout a typical day. Not met, progressing.   4.  Patient will demonstrate improved overall function per FOTO Survey to CJ = at least 20% but < 40% impaired, limited or restricted score or less. Not met, progressing.   5. Stand for  Hour concert with min to no pain  Not met, progressing.   6. Tolerate travel well Met 8/15/18        Plan   Continue with established Plan of Care towards established PT goals.

## 2018-10-11 ENCOUNTER — CLINICAL SUPPORT (OUTPATIENT)
Dept: REHABILITATION | Facility: OTHER | Age: 48
End: 2018-10-11
Attending: PHYSICAL MEDICINE & REHABILITATION
Payer: COMMERCIAL

## 2018-10-11 DIAGNOSIS — M54.50 CHRONIC BILATERAL LOW BACK PAIN WITHOUT SCIATICA: ICD-10-CM

## 2018-10-11 DIAGNOSIS — G89.29 CHRONIC BILATERAL LOW BACK PAIN WITHOUT SCIATICA: ICD-10-CM

## 2018-10-11 PROCEDURE — 97110 THERAPEUTIC EXERCISES: CPT

## 2018-10-11 NOTE — PROGRESS NOTES
[facility]      Name: Arely Haro  Clinic Number: [id number]  Date of Treatment: 10/11/2018   Diagnosis:   Encounter Diagnosis   Name Primary?    Chronic bilateral low back pain without sciatica      Physician: Magdalena Head, *    Pain pattern determined: 1 PEN  Plan of care signed: 18   Time in: 7:00  Time Out: 8:00  Total Treatment time: 60  Precautions: Standard  Visit #: 8     POC due: 18 Resent 08/15/2018    Reassessment due: 10/27/18    Subjective   Arely reports no pain , just soreness with standing activities.      Patient reports their pain to be 0/10 on a 0-10 scale with 0 being no pain and 10 being the worst pain imaginable.    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,     Objective     Baseline IM Testing Results:   Date of testin18  ROM 0-48 deg (increased to 54-0)    Max Peak Torque 114    Min Peak Torque 54    Flex/Ext Ratio 2.3/1   % below normative data -59%   Counter weight 195   Femur number 4   Seat pad 0          FOTO: Focus on Therapeutic Outcomes   Category: lumbar   % Impaired: 29%  Current Score  = CJ = at least 20% but < 40% impaired, limited or restricted  Goal at Discharge Score = CJ = at least 20% but < 40% impaired, limited or restricted    Treatment    Pt was instructed in and performed the following:     Arely received therapeutic exercises to develop/improved posture, cardiovascular endurance, muscular endurance, lumbar/cervical ROM, strength and muscular endurance for 60 minutes including the following exercises:     [facility] 10/11/2018   Visit Number 8   VAS Pain Rating 0   Recumbent Bike Seat Pos. 15   Time 10   Flexion in Lying 10   Lumbar Extension Seat Pad -   Femur Restraint  -   Top Dead Center -   Counterweight -   Lumbar Flexion -   Lumbar Extension -   Lumbar Peak Torque -   Min Torque -   Test Percent Below Normative Data -   Lumbar Weight 68   Repetitions 20   Rating of Perceived Exertion 4.5   Ice - Z Lie (in min.) 10       Pelvic tilts 10 reps 3/day  PPT + bridges 10x   Flexion in lie 10 reps 3/day  Side bridge  5x 10 s/h  multifudus  10x  Quad hip ext 10x  Ham stretch 10 reps 5 sec (Supine and long sitting with contract/relax technique)        Peripheral muscle strengthening which included 1 set of 15-20 repetitions at a slow, controlled 7 second per rep pace focused on strengthening supporting musculature for improved body mechanics and functional mobility.  Pt and therapist focused on proper form during treatment to ensure optimal strengthening of each targeted muscle group.  Machines were utilized including torso rotation, leg extension, leg curl, chest press, upright row. Tricep extension, bicep curl, leg press, and hip abduction added on third visit.       Arely received the following manual therapy techniques: None       Home Exercise Program as follows:     Pelvic tilts 10 reps 3/day  Flexion in lie 10 reps 3/day  QL stretch 5 reps 10 sec 2/day  Ham stretch 10 reps 5 sec before tennis  Calf stretch 10 reps  Seated trunk flexion 10x  Standing pelvic tilts 10x   Side bridge 10x     Handouts were given to the patient. Pt demo good understanding of the education provided. Arely demonstrated good return demonstration of activities.     Lumbar roll use compliance: Yes  Additional exercises taught this treatment session:   Added multifidus and quad hip ext    Assessment     Pt presents with minimal soreness today.  Pt reports heis still concerend regarding prolonged standing and dancing.  Discussed adding ex to increase extension strength and stability and assess response to new ex next visit. Reviewd multifidus and quad LE ext to HEP.  Also instructed pt to utilize lumbar roll  at his desk at home with prolonged sitting.  Pt reports roll has made a big difference in driving comfort.  Pt able to complete 20 reps at 68ft/lbs.  Increase 5% based upon exertion level.  Patient is making good progress towards established goals.  Pt will continue to benefit from skilled outpatient physical therapy to address the deficits stated in the impairment chart, provide pt/family education and to maximize pt's level of independence in the home and community environment.       Pt's spiritual, cultural and educational needs considered and pt agreeable to plan of care and goals as stated below:     Medical necessity is demonstrated by the following problem list.    Pt presents with the following impairments:   History  Co-morbidities and personal factors that may impact the plan of care Examination  Body Structures and Functions, activity limitations and participation restrictions that may impact the plan of care Clinical Presentation    Decision Making/ Complexity Score   Co-morbidities:      Chronic pain     Personal Factors:     Body Regions:   back  lower extremities     Body Systems:   gross symmetry  ROM  strength  gross coordinated movement  transitions  motor control  motor learning     Activity limitations:   Learning and applying knowledge  no deficits     General Tasks and Commands  no deficits     Communication  no deficits     Mobility  using transportation (bus, train, plane, car)     Self care  looking after one's health     Domestic Life  doing house work (cleaning house, washing dishes, laundry)     Interactions/Relationships  no deficits     Life Areas  no deficits     Community and Social Life  no deficits     Participation Restrictions:   Reduced ability to travel and attend concerts       stable and uncomplicated    low            GOALS: Pt is in agreement with the following goals.     Short term goals:  6 weeks or 10 visits   1.  Pt will demonstrate increased lumbar ROM by at least 3  degrees from the initial ROM value with improvements noted in functional ROM and ability to perform ADLs  Met 9/27/18  2.  Pt will demonstrate increased improved lumbar strength on testing by 10 % with improvements functionally noted with standing/walking/lifting groceries, self care  Not met, progressing.   3.  Patient report a reduction in worst pain score by 1-2 points for improved tolerance during work and recreational activities  Not met, progressing.   4.  Pt able to perform HEP correctly with minimal cueing or supervision for therapist Not met, progressing.         Long term goals: 13 weeks or 20 visits   1. Pt will demonstrate increased lumbar ROM by at least 6 degrees from initial ROM value, resulting in improved ability to perform functional fwd bending while standing and sitting.  Met 9/27/18  2. Pt will demonstrate increased maximum isometric torque value by 30% when compared to the initial value resulting in improved ability to perform bending, lifting, and carrying activities safely, confidently. Not met, progressing.   3. Pt to demonstrate ability to independently control and reduce their pain through posture positioning and mechanical movements throughout a typical day. Not met, progressing.   4.  Patient will demonstrate improved overall function per FOTO Survey to CJ = at least 20% but < 40% impaired, limited or restricted score or less. Not met, progressing.   5. Stand for  Hour concert with min to no pain  Not met, progressing.   6. Tolerate travel well Met 8/15/18        Plan   Continue with established Plan of Care towards established PT goals.

## 2018-10-11 NOTE — PATIENT INSTRUCTIONS
Hip Extension (All-Fours)        Lift right leg back with knee slightly flexed. Do not arch neck or back.  Repeat ____ times per set. Do ____ sets per session. Do ____ sessions per day.     [email or web address]://[email or web address].[email or web address]     Copyright © [employer]. All rights reserved.

## 2018-10-15 ENCOUNTER — CLINICAL SUPPORT (OUTPATIENT)
Dept: REHABILITATION | Facility: OTHER | Age: 48
End: 2018-10-15
Attending: PHYSICAL MEDICINE & REHABILITATION
Payer: COMMERCIAL

## 2018-10-15 DIAGNOSIS — G89.29 CHRONIC BILATERAL LOW BACK PAIN WITHOUT SCIATICA: ICD-10-CM

## 2018-10-15 DIAGNOSIS — M54.50 CHRONIC BILATERAL LOW BACK PAIN WITHOUT SCIATICA: ICD-10-CM

## 2018-10-15 PROCEDURE — 97110 THERAPEUTIC EXERCISES: CPT

## 2018-10-16 NOTE — PROGRESS NOTES
[facility]      Name: Arely Haro  Clinic Number: [id number]  Date of Treatment: 10/16/2018   Diagnosis:   Encounter Diagnosis   Name Primary?    Chronic bilateral low back pain without sciatica      Physician: Magdalena Head, *    Pain pattern determined: 1 PEN  Plan of care signed: 18   Time in: 7:30  Time Out: 8:30  Total Treatment time: 50  Precautions: Standard  Visit #: 9 (test next session)      POC due: 18 Resent 08/15/2018    Reassessment due: 18    Subjective   Arely reports no pain , just soreness with standing activities. Pt is open to DC in 1-2 visits. Please inform more about wellness program.       Patient reports their pain to be 0/10 on a 0-10 scale with 0 being no pain and 10 being the worst pain imaginable.    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,     Objective     Baseline IM Testing Results:   Date of testin18  ROM 0-48 deg (increased to 54-0)    Max Peak Torque 114    Min Peak Torque 54    Flex/Ext Ratio 2.3/1   % below normative data -59%   Counter weight 195   Femur number 4   Seat pad 0          FOTO: Focus on Therapeutic Outcomes   Category: lumbar   % Impaired: 29%  Current Score  = CJ = at least 20% but < 40% impaired, limited or restricted  Goal at Discharge Score = CJ = at least 20% but < 40% impaired, limited or restricted    Treatment    Pt was instructed in and performed the following:     Arely received therapeutic exercises to develop/improved posture, cardiovascular endurance, muscular endurance, lumbar/cervical ROM, strength and muscular endurance for 60 minutes including the following exercises:     [facility] 10/15/2018   Visit Number 9   VAS Pain Rating 0   Recumbent  Bike Seat Pos. 15   Time 10   Flexion in Lying 10   Lumbar Extension Seat Pad -   Femur Restraint -   Top Dead Center -   Counterweight -   Lumbar Flexion -   Lumbar Extension -   Lumbar Peak Torque -   Min Torque -   Test Percent Below Normative Data -   Lumbar Weight 71   Repetitions 15   Rating of Perceived Exertion 5   Ice - Z Lie (in min.) 10     Pelvic tilts 10 reps 3/day  PPT + bridges 10x   Flexion in lie 10 reps 3/day  Side bridge  5x 10 s/h  multifudus  10x  Quad hip ext 10x  Ham stretch 10 reps 5 sec (Supine and long sitting with contract/relax technique)      Peripheral muscle strengthening which included 1 set of 15-20 repetitions at a slow, controlled 7 second per rep pace focused on strengthening supporting musculature for improved body mechanics and functional mobility.  Pt and therapist focused on proper form during treatment to ensure optimal strengthening of each targeted muscle group.  Machines were utilized including torso rotation, leg extension, leg curl, chest press, upright row. Tricep extension, bicep curl, leg press, and hip abduction added on third visit.       Arely received the following manual therapy techniques: None       Home Exercise Program as follows:     Pelvic tilts 10 reps 3/day  Flexion in lie 10 reps 3/day  QL stretch 5 reps 10 sec 2/day  Ham stretch 10 reps 5 sec before tennis  Calf stretch 10 reps  Seated trunk flexion 10x  Standing pelvic tilts 10x   Side bridge 10x   Multifidus  quad hip ext    Handouts were given to the patient. Pt demo good understanding of the education provided. Arely demonstrated good return demonstration of activities.     Lumbar roll use compliance: Yes  Additional exercises taught this treatment session:   HEP review     Assessment     Pt presents with minimal soreness today.. Reviewd multifidus and quad LE ext with mild v/c.  Also instructed pt to utilize lumbar roll at his desk at home with prolonged sitting.  Pt reports roll has made a big  difference in driving comfort.  Pt able to complete 15 reps at 71ft/lbs. Retest next session. Pt agrees he has good strategies to manage symptoms and may DC to wellness in 1-2 visits as appropriate.  Patient is making good progress towards established goals.  Pt will continue to benefit from skilled outpatient physical therapy to address the deficits stated in the impairment chart, provide pt/family education and to maximize pt's level of independence in the home and community environment.       Pt's spiritual, cultural and educational needs considered and pt agreeable to plan of care and goals as stated below:     Medical necessity is demonstrated by the following problem list.    Pt presents with the following impairments:   History  Co-morbidities and personal factors that may impact the plan of care Examination  Body Structures and Functions, activity limitations and participation restrictions that may impact the plan of care Clinical Presentation    Decision Making/ Complexity Score   Co-morbidities:      Chronic pain     Personal Factors:     Body Regions:   back  lower extremities     Body Systems:   gross symmetry  ROM  strength  gross coordinated movement  transitions  motor control  motor learning     Activity limitations:   Learning and applying knowledge  no deficits     General Tasks and Commands  no deficits     Communication  no deficits     Mobility  using transportation (bus, train, plane, car)     Self care  looking after one's health     Domestic Life  doing house work (cleaning house, washing dishes, laundry)     Interactions/Relationships  no deficits     Life Areas  no deficits     Community and Social Life  no deficits     Participation Restrictions:   Reduced ability to travel and attend concerts       stable and uncomplicated    low            GOALS: Pt is in agreement with the following goals.     Short term goals:  6 weeks or 10 visits   1.  Pt will demonstrate increased lumbar ROM by at  least 3 degrees from the initial ROM value with improvements noted in functional ROM and ability to perform ADLs  Met 9/27/18  2.  Pt will demonstrate increased improved lumbar strength on testing by 10 % with improvements functionally noted with standing/walking/lifting groceries, self care  Not met, progressing.   3.  Patient report a reduction in worst pain score by 1-2 points for improved tolerance during work and recreational activities  Not met, progressing.   4.  Pt able to perform HEP correctly with minimal cueing or supervision for therapist Not met, progressing.         Long term goals: 13 weeks or 20 visits   1. Pt will demonstrate increased lumbar ROM by at least 6 degrees from initial ROM value, resulting in improved ability to perform functional fwd bending while standing and sitting.  Met 9/27/18  2. Pt will demonstrate increased maximum isometric torque value by 30% when compared to the initial value resulting in improved ability to perform bending, lifting, and carrying activities safely, confidently. Not met, progressing.   3. Pt to demonstrate ability to independently control and reduce their pain through posture positioning and mechanical movements throughout a typical day. Not met, progressing.   4.  Patient will demonstrate improved overall function per FOTO Survey to CJ = at least 20% but < 40% impaired, limited or restricted score or less. Not met, progressing.   5. Stand for  Hour concert with min to no pain  Not met, progressing.   6. Tolerate travel well Met 8/15/18        Plan   Continue with established Plan of Care towards established PT goals.

## 2018-10-17 ENCOUNTER — CLINICAL SUPPORT (OUTPATIENT)
Dept: REHABILITATION | Facility: OTHER | Age: 48
End: 2018-10-17
Attending: PHYSICAL MEDICINE & REHABILITATION
Payer: COMMERCIAL

## 2018-10-17 DIAGNOSIS — M54.50 CHRONIC BILATERAL LOW BACK PAIN WITHOUT SCIATICA: ICD-10-CM

## 2018-10-17 DIAGNOSIS — G89.29 CHRONIC BILATERAL LOW BACK PAIN WITHOUT SCIATICA: ICD-10-CM

## 2018-10-17 PROCEDURE — 97110 THERAPEUTIC EXERCISES: CPT

## 2018-10-17 NOTE — PROGRESS NOTES
[facility]      Name: Arely Haro  Clinic Number: [id number]  Date of Treatment: 10/17/2018   Diagnosis:   Encounter Diagnosis   Name Primary?    Chronic bilateral low back pain without sciatica      Physician: Magdalena Head, *    Pain pattern determined: 1 PEN  Plan of care signed: 18   Time in: 7:00  Time Out: 8:00  Total Treatment time: 60  Precautions: Standard  Visit #:10 DC     POC due: 18 Resent 08/15/2018    Reassessment due: 18    Subjective   Arely reports no pain today.  Pt reports he has had no difficulty with added exercises for stability      Patient reports their pain to be 0/10 on a 0-10 scale with 0 being no pain and 10 being the worst pain imaginable.    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,     Objective     Baseline IM Testing Results:   Date of testin18  ROM 0-48 deg (increased to 54-0)    Max Peak Torque 114    Min Peak Torque 54    Flex/Ext Ratio 2.3/1   % below normative data -59%   Counter weight 195   Femur number 4   Seat pad 0      Mid point 10/17/18  ROM 0-48 deg (increased to 54-0)    Max Peak Torque 159   Min Peak Torque 71   Flex/Ext Ratio 2.2/1   % below normative data -42%   Counter weight 195   Femur number 4   Seat pad 0       40% increase in strength    FOTO: Focus on Therapeutic Outcomes   Category: lumbar   % Impaired: 29%  Current Score  = CJ = at least 20% but < 40% impaired, limited or restricted  Goal at Discharge Score = CJ = at least 20% but < 40% impaired, limited or restricted   24%  10th visit  Treatment    Pt was instructed in and performed the following:     Arely received therapeutic exercises to develop/improved posture, cardiovascular endurance, muscular  endurance, lumbar/cervical ROM, strength and muscular endurance for 60 minutes including the following exercises:   [facility] 10/17/2018   Visit Number 10   VAS Pain Rating 0   Recumbent Bike Seat Pos. 15   Time 10   Flexion in Lying 10   Lumbar Extension Seat Pad -   Femur Restraint -   Top Dead Center -   Counterweight -   Lumbar Flexion 54   Lumbar Extension 0   Lumbar Peak Torque 149   Min Torque 71   Test Percent Below Normative Data 42   Test Percent Gain in Strength from Initial  40   Lumbar Weight -   Repetitions -   Rating of Perceived Exertion -   Ice - Z Lie (in min.) 10       Bridge c march  PPT + bridges 10x   Flexion in lie 10 reps 3/day  Side bridge  5x 10 s/h  multifudus  10x  Quad bir dog 10x  Ham stretch 10 reps 5 sec (Supine and long sitting with contract/relax technique)      Peripheral muscle strengthening which included 1 set of 15-20 repetitions at a slow, controlled 7 second per rep pace focused on strengthening supporting musculature for improved body mechanics and functional mobility.  Pt and therapist focused on proper form during treatment to ensure optimal strengthening of each targeted muscle group.  Machines were utilized including torso rotation, leg extension, leg curl, chest press, upright row. Tricep extension, bicep curl, leg press, and hip abduction added on third visit.       Arely received the following manual therapy techniques: None       Home Exercise Program as follows:     Pelvic tilts 10 reps 3/day  Flexion in lie 10 reps 3/day  QL stretch 5 reps 10 sec 2/day  Ham stretch 10 reps 5 sec before tennis  Calf stretch 10 reps  Seated trunk flexion 10x  Standing pelvic tilts 10x   Side bridge 10x   Multifidus  quad hip ext    Handouts were given to the patient. Pt demo good understanding of the education provided. Arely demonstrated good return demonstration of activities.     Lumbar roll use compliance: Yes  Additional exercises taught this treatment session:   HEP  review     Assessment   Patient has attended 10 visits of the [facility] program for aerobic work, med ex isometric testing with dynamic strengthening on med ex equipment for spine, whole body strengthening on med ex equipment, HEP, education.  Patient has completed [facility] Program and is ready to be transitioned into wellness program.  Importance of wellness program, and attending 2/month to maintain strength stressed.  Importance of continuing there ex and body mech and ergonomics stressed.   Patient demonstrates improvement in ability to reduce symptoms, improved posture, improved ROM and improved strength.   Reviewed HEP, and importance of maintaining a healthy spine through continued stretching and performance of HEP, good posture, good ergonomics, good body mech with ADL, leisure, and work.  Discharge handout with HEP given, and discussed aspects of exercise program, cardio, strengthening, and stretching.    Patient expressed understanding information given.  Patient plans to attend wellness and is ready to transition to wellness.  Pt D/C'd early secondary to attaining all goals.      Pt's spiritual, cultural and educational needs considered and pt agreeable to plan of care and goals as stated below:     Medical necessity is demonstrated by the following problem list.    Pt presents with the following impairments:   History  Co-morbidities and personal factors that may impact the plan of care Examination  Body Structures and Functions, activity limitations and participation restrictions that may impact the plan of care Clinical Presentation    Decision Making/ Complexity Score   Co-morbidities:      Chronic pain     Personal Factors:     Body Regions:   back  lower extremities     Body Systems:   gross symmetry  ROM  strength  gross coordinated movement  transitions  motor control  motor learning     Activity limitations:   Learning and applying knowledge  no deficits     General Tasks and Commands  no  deficits     Communication  no deficits     Mobility  using transportation (bus, train, plane, car)     Self care  looking after one's health     Domestic Life  doing house work (cleaning house, washing dishes, laundry)     Interactions/Relationships  no deficits     Life Areas  no deficits     Community and Social Life  no deficits     Participation Restrictions:   Reduced ability to travel and attend concerts       stable and uncomplicated    low            GOALS: Pt is in agreement with the following goals.     Short term goals:  6 weeks or 10 visits   1.  Pt will demonstrate increased lumbar ROM by at least 3 degrees from the initial ROM value with improvements noted in functional ROM and ability to perform ADLs  Met 9/27/18  2.  Pt will demonstrate increased improved lumbar strength on testing by 10 % with improvements functionally noted with standing/walking/lifting groceries, self care  Met.   3.  Patient report a reduction in worst pain score by 1-2 points for improved tolerance during work and recreational activities met   4.  Pt able to perform HEP correctly with minimal cueing or supervision for therapist met     Long term goals: 13 weeks or 20 visits   1. Pt will demonstrate increased lumbar ROM by at least 6 degrees from initial ROM value, resulting in improved ability to perform functional fwd bending while standing and sitting.  Met 9/27/18  2. Pt will demonstrate increased maximum isometric torque value by 30% when compared to the initial value resulting in improved ability to perform bending, lifting, and carrying activities safely, confidently. MET  3. Pt to demonstrate ability to independently control and reduce their pain through posture positioning and mechanical movements throughout a typical day. MET  4.  Patient will demonstrate improved overall function per FOTO Survey to CJ = at least 20% but < 40% impaired, limited or restricted score or less.MET  5. Stand for  Hour concert with min to no  pain  MET  6. Tolerate travel well Met 8/15/18        Plan   D/c to wellness

## 2018-10-18 ENCOUNTER — TELEPHONE (OUTPATIENT)
Dept: REHABILITATION | Facility: OTHER | Age: 48
End: 2018-10-18

## 2018-10-18 NOTE — TELEPHONE ENCOUNTER
Patient needs appointments for Wellness program/gym. Called to schedule them for him, left a message.

## 2018-10-26 ENCOUNTER — DOCUMENTATION ONLY (OUTPATIENT)
Dept: REHABILITATION | Facility: OTHER | Age: 48
End: 2018-10-26
Attending: PHYSICAL MEDICINE & REHABILITATION
Payer: COMMERCIAL

## 2018-10-26 NOTE — PROGRESS NOTES
Health  Wellness Visit Note    Name: Arely Haro  Clinic Number: [id number]  Physician: Magdalena Head, *  Diagnosis: No diagnosis found.  Past Medical History:   Diagnosis Date    Asthma     Hemochromatosis      Visit Number: 11  Precautions: Standard  6 Month: 2/15/2019  12 Month: 8/15/2018  Time In: 7:04 AM  Time Out: 7:52 AM  Total Treatment Time: 48 minutes    Subjective:   Patient reports that he has not been stretching as much as he should; he averages a few times per week.  PT does not ice at all.  He plays tennis 3-5 times per week for 1.5 hours.  He really enjoys it and feels like it does not affect his back.      Objective:   Arely completed therapeutic stretches (EIL, TAD) and the following MedX exercise machines: core lumbar, torso rotation l/r, leg extension, leg curl, upright row, chest press, biceps curl, triceps extension, leg press    See exercise log in patient folder for rate of exertion and repetitions completed.     Assessment:   Patient tolerated all Med-X machines without any increase in pain.  PT iced lower back after the completion of all exercises.  PT is able to complete all of the exercises but has to skip some whenever he is pressed for time.  Plays tennis 3-5 times per week for 1.5 hours.      Plan:  Continue with established plan of care towards wellness goals. PT's end date will be 8/15/2019 because he had to wait a very long time between his Evaluation visit and first follow-up visit.  PT will probably will start with Plan B.     Health  : Miguelina Banda  10/26/2018

## 2018-10-31 ENCOUNTER — DOCUMENTATION ONLY (OUTPATIENT)
Dept: REHABILITATION | Facility: OTHER | Age: 48
End: 2018-10-31
Attending: PHYSICAL MEDICINE & REHABILITATION
Payer: COMMERCIAL

## 2018-10-31 NOTE — PROGRESS NOTES
Health  Wellness Visit Note    Name: Arely Haro  Clinic Number: [id number]  Physician: Magdalena Head, *  Diagnosis: No diagnosis found.  Past Medical History:   Diagnosis Date    Asthma     Hemochromatosis      Visit Number: 11  Precautions: Standard  6 Month: 2/15/2019  12 Month: 8/15/2019  Time In: 4:30 PM  Time Out: 5:15PM  Total Treatment Time: 45 minutes    Subjective:   Patient reports having no pain today. He was feeling great with the machines and is ready for increases on everything. Pt said he feels worse when he stands for long periods of time. He has been trying to tighten his core with pelvic tilts when he stands and he thinks that is helping.  He plays tennis 3-5 times per week for 1.5 hours.  He really enjoys it and feels like it does not affect his back.      Objective:   Arely completed therapeutic stretches (bridge with march, PPT+bridge, TAD, side bridge, multifudus, quad bird dog, ham stretch) and the following MedX exercise machines: core lumbar, torso rotation l/r, leg extension, leg curl, upright row, chest press, biceps curl, triceps extension, leg press    See exercise log in patient folder for rate of exertion and repetitions completed.     Assessment:   Patient tolerated all Med-X machines without any increase in pain.  PT iced lower back after the completion of all exercises.  PT is able to complete all of the exercises but has to skip some whenever he is pressed for time.  Plays tennis 3-5 times per week for 1.5 hours.      Plan:  Continue with established plan of care towards wellness goals. PT's end date will be 8/15/2019 because he had to wait a very long time between his Evaluation visit and first follow-up visit.  PT will probably will start with Plan B.     Health  : Liseth Mittal, PCT  10/31/2018

## 2018-11-08 ENCOUNTER — DOCUMENTATION ONLY (OUTPATIENT)
Dept: REHABILITATION | Facility: OTHER | Age: 48
End: 2018-11-08
Attending: PHYSICAL MEDICINE & REHABILITATION
Payer: COMMERCIAL

## 2018-11-08 NOTE — PROGRESS NOTES
Health  Wellness Visit Note    Name: Arely Haro  Clinic Number: [id number]  Physician: No ref. provider found  Diagnosis: No diagnosis found.  Past Medical History:   Diagnosis Date    Asthma     Hemochromatosis      Visit Number: 13  Precautions: Standard  6 Month: 2/15/2019  12 Month: 8/15/2019  Time In: 5:00 PM  Time Out: 5:45PM  Total Treatment Time: 45 minutes    Subjective:   Patient reports having no pain today. He was feeling great with the machines and is ready for increases on almost everything. Pt said he feels worse when he stands for long periods of time. He has been trying to tighten his core with pelvic tilts when he stands and he thinks that is helping. He usually plays tennis 3-5 times per week for 1.5 hours; hasn't been in about a week but plans to go tomorrow if the weather permits. He really enjoys it and feels like it does not affect his back; tennis is his main source of activity along with his wellness visits.  HEP compliant     Objective:   Arely completed therapeutic stretches (bridge with march, PPT+bridge, TAD, side bridge, multifudus, quad bird dog, ham stretch) and the following MedX exercise machines: core lumbar, torso rotation l/r, leg extension, leg curl, upright row, chest press, biceps curl, triceps extension, leg press    See exercise log in patient folder for rate of exertion and repetitions completed.     Assessment:   Patient tolerated all Med-X machines without any increase in pain.  PT iced lower back after the completion of all exercises.  PT is able to complete all of the exercises but has to skip some whenever he is pressed for time.  Plays tennis 3-5 times per week for 1.5 hours.      Plan:  Continue with established plan of care towards wellness goals. PT's end date will be 8/15/2019 because he had to wait a very long time between his Evaluation visit and first follow-up visit.  PT will probably will start with Plan B.     Health  : Grabiel Garner,  PCT  11/8/2018

## 2018-11-12 ENCOUNTER — DOCUMENTATION ONLY (OUTPATIENT)
Dept: REHABILITATION | Facility: OTHER | Age: 48
End: 2018-11-12
Attending: PHYSICAL MEDICINE & REHABILITATION
Payer: COMMERCIAL

## 2018-11-12 NOTE — PROGRESS NOTES
Health  Wellness Visit Note    Name: Arely Haro  Clinic Number: [id number]  Physician: Magdalena Head, *  Diagnosis: No diagnosis found.  Past Medical History:   Diagnosis Date    Asthma     Hemochromatosis      Visit Number: 14  Precautions: Standard  6 Month: 2/15/2019  12 Month: 8/15/2019  Time In: 7:08 AM  Time Out: 8:00 AM  Total Treatment Time:  minutes    Subjective:   Patient reports that his lower back feels great today; he has been stretching at least 3 times per week but never icing.  PT has been playing tennis but not as much as he would like because of the rain.  He only has access to outdoors tennis courts.  PT has been hossein to go up in weight on most of the exercises; he mentioned today that the exercises are finally getting to the point where he is being challenged.  Mr. Mcgee had been taking it easy whenever he was in physical therapy because he did not want to hurt himself.  Now that he realizes that the machines are safe and his lower back feels stronger, he wants to push a little harder and challenge his muscles.  HEP compliant     Objective:   Arely completed therapeutic stretches (bridge with march, PPT+bridge, TAD, side bridge, multifudus, quad bird dog, ham stretch) and the following MedX exercise machines: core lumbar, torso rotation l/r, leg extension, leg curl, upright row, chest press, biceps curl, triceps extension, leg press    See exercise log in patient folder for rate of exertion and repetitions completed.     Assessment:   Patient tolerated all Med-X machines without any increase in pain.  PT iced lower back after the completion of all exercises.  PT is able to complete all of the exercises but has to skip some whenever he is pressed for time.  Plays tennis 3-5 times per week for 1.5 hours.      Plan:  Continue with established plan of care towards wellness goals. PT's end date will be 8/15/2019 because he had to wait a very long time between his Evaluation visit and  first follow-up visit.  PT will probably will start with Plan B.     Health  : Miguelina Banda  11/12/2018

## 2018-11-14 ENCOUNTER — DOCUMENTATION ONLY (OUTPATIENT)
Dept: REHABILITATION | Facility: OTHER | Age: 48
End: 2018-11-14
Attending: PHYSICAL MEDICINE & REHABILITATION
Payer: COMMERCIAL

## 2018-11-14 NOTE — PROGRESS NOTES
Health  Wellness Visit Note    Name: Arely Haro  Clinic Number: [id number]  Physician: Magdalena Head, *  Diagnosis: No diagnosis found.  Past Medical History:   Diagnosis Date    Asthma     Hemochromatosis      Visit Number: 15  Precautions: Standard  6 Month: 2/15/2019  12 Month: 8/15/2019  Time In: 5:00PM  Time Out: 5:45PM  Total Treatment Time:  minutes    Subjective:   Patient reports that his lower back is feeling fine, no reports of pain or stiffness currently. Has not played tennis this week due to bad weather. Really enjoys playing tennis, likes the cardio. Glad he has made the decision to push his self more while working out. Partially HEP compliant.       Objective:   Arely completed therapeutic stretches (bridge with march, PPT+bridge, TAD, side bridge, multifudus, quad bird dog, ham stretch) and the following MedX exercise machines: core lumbar, torso rotation l/r, leg extension, leg curl, upright row, chest press, biceps curl, triceps extension, leg press    See exercise log in patient folder for rate of exertion and repetitions completed.     Assessment:   Patient tolerated all Med-X machines without any increase in pain.  PT iced lower back after the completion of all exercises.  PT is able to complete all of the exercises but has to skip some whenever he is pressed for time.  Plays tennis 3-5 times per week for 1.5 hours.      Plan:  Continue with established plan of care towards wellness goals. PT's end date will be 8/15/2019 because he had to wait a very long time between his Evaluation visit and first follow-up visit.      Health  : Anish Lundy  11/14/2018

## 2018-11-27 ENCOUNTER — DOCUMENTATION ONLY (OUTPATIENT)
Dept: REHABILITATION | Facility: OTHER | Age: 48
End: 2018-11-27
Attending: PHYSICAL MEDICINE & REHABILITATION
Payer: COMMERCIAL

## 2018-11-27 NOTE — PROGRESS NOTES
Health  Wellness Visit Note    Name: Arely Haro  Clinic Number: [id number]  Physician: Magdalena Head, *  Diagnosis: No diagnosis found.  Past Medical History:   Diagnosis Date    Asthma     Hemochromatosis      Visit Number: 16  Precautions: Standard  6 Month: 2/15/2019  12 Month: 8/15/2019  Time In: 5:00PM  Time Out: 5:45PM  Total Treatment Time:  minutes    Subjective:   Patient reports that his lower back is feeling fine, no reports of pain or stiffness currently. Really enjoys playing tennis, likes the cardio. Glad he has made the decision to push himself more while working out. Partially HEP compliant.       Objective:   Arely completed therapeutic stretches (bridge with march, PPT+bridge, TAD, side bridge, multifudus, quad bird dog, ham stretch) and the following MedX exercise machines: core lumbar, torso rotation l/r, leg extension, leg curl, upright row, chest press, biceps curl, triceps extension, leg press    See exercise log in patient folder for rate of exertion and repetitions completed.     Assessment:   Patient tolerated all Med-X machines without any increase in pain.  PT iced lower back after the completion of all exercises.  PT is able to complete all of the exercises but has to skip some whenever he is pressed for time.  Plays tennis 3-5 times per week for 1.5 hours.      Plan:  Continue with established plan of care towards wellness goals. PT's end date will be 8/15/2019 because he had to wait a very long time between his Evaluation visit and first follow-up visit.      Health  : Liseth Mittal, PCT  11/27/2018

## 2018-11-29 ENCOUNTER — DOCUMENTATION ONLY (OUTPATIENT)
Dept: REHABILITATION | Facility: OTHER | Age: 48
End: 2018-11-29
Attending: PHYSICAL MEDICINE & REHABILITATION
Payer: COMMERCIAL

## 2018-11-29 NOTE — PROGRESS NOTES
Health  Wellness Visit Note    Name: Arely Haro  Clinic Number: [id number]  Physician: Magdalena Head, *  Diagnosis: No diagnosis found.  Past Medical History:   Diagnosis Date    Asthma     Hemochromatosis      Visit Number: 17  Precautions: Standard  6 Month: 2/15/2019  12 Month: 8/15/2019  Time In: 5:00PM  Time Out: 5:45PM  Total Treatment Time: 45 minutes    Subjective:   Patient reports he's feeling good today. He said he was not feeling like coming into wellness today, but he was scheduled so that kept him accountable. Really enjoys playing tennis, likes the cardio. Glad he has made the decision to push himself more while working out. Partially HEP compliant.       Objective:   Arely completed therapeutic stretches (bridge with march, PPT+bridge, TAD, side bridge, multifudus, quad bird dog, ham stretch) and the following MedX exercise machines: core lumbar, torso rotation l/r, leg extension, leg curl, upright row, chest press, biceps curl, triceps extension, leg press    See exercise log in patient folder for rate of exertion and repetitions completed.     Assessment:   Patient tolerated all Med-X machines without any increase in pain.  PT iced lower back after the completion of all exercises.  PT is able to complete all of the exercises but has to skip some whenever he is pressed for time.  Plays tennis 3-5 times per week for 1.5 hours.      Plan:  Continue with established plan of care towards wellness goals. PT's end date will be 8/15/2019 because he had to wait a very long time between his Evaluation visit and first follow-up visit.      Health  : Liseth Mittal, PCT  11/29/2018

## 2018-12-03 ENCOUNTER — DOCUMENTATION ONLY (OUTPATIENT)
Dept: REHABILITATION | Facility: OTHER | Age: 48
End: 2018-12-03
Attending: PHYSICAL MEDICINE & REHABILITATION
Payer: COMMERCIAL

## 2018-12-03 NOTE — PROGRESS NOTES
Health  Wellness Visit Note    Name: Arely Haro  Clinic Number: [id number]  Physician: Magdalena Head, *  Diagnosis: No diagnosis found.  Past Medical History:   Diagnosis Date    Asthma     Hemochromatosis      Visit Number: 18  Precautions: Standard  6 Month: 2/15/2019  12 Month: 8/15/2019  Time In: 5:00PM  Time Out: 5:40PM  Total Treatment Time: 40 minutes    Subjective:   Patient reports he's feeling good today. No current pain, said he attend a show last Friday, did not have any increase in  Symptoms with prolonged standing. Really enjoys playing tennis, likes the cardio. Glad he has made the decision to push himself more while working out. Partially HEP compliant.       Objective:   Arely completed therapeutic stretches (bridge with march, PPT+bridge, TAD, side bridge, multifudus, quad bird dog, ham stretch) and the following MedX exercise machines: core lumbar, torso rotation l/r, leg extension, leg curl, upright row, chest press, biceps curl, triceps extension, leg press    See exercise log in patient folder for rate of exertion and repetitions completed.     Assessment:   Patient tolerated all Med-X machines without any increase in pain.  PT iced lower back after the completion of all exercises.  PT is able to complete all of the exercises but has to skip some whenever he is pressed for time.  Plays tennis 3-5 times per week for 1.5 hours.      Plan:  Continue with established plan of care towards wellness goals. PT's end date will be 8/15/2019 because he had to wait a very long time between his Evaluation visit and first follow-up visit.      Health  : Anish Lundy  12/3/2018

## 2018-12-06 ENCOUNTER — DOCUMENTATION ONLY (OUTPATIENT)
Dept: REHABILITATION | Facility: OTHER | Age: 48
End: 2018-12-06
Attending: PHYSICAL MEDICINE & REHABILITATION
Payer: COMMERCIAL

## 2018-12-06 NOTE — PROGRESS NOTES
Health  Wellness Visit Note    Name: Arely Haro  Clinic Number: [id number]  Physician: Magdalena Head, *  Diagnosis: No diagnosis found.  Past Medical History:   Diagnosis Date    Asthma     Hemochromatosis      Visit Number: 19  Precautions: Standard  6 Month: 2/15/2019  12 Month: 8/15/2019  Time In: 5:00PM  Time Out: 5:40PM  Total Treatment Time: 40 minutes    Subjective:   Patient reports he's feeling good today. He said this is the first day in a few visits that he wanted to show up to his session. Really enjoys playing tennis, likes the cardio. Glad he has made the decision to push himself more while working out. Partially HEP compliant. He will be going to a few shows this weekend.      Objective:   Arely completed therapeutic stretches (bridge with march, PPT+bridge, TAD, side bridge, multifudus, quad bird dog, ham stretch) and the following MedX exercise machines: core lumbar, torso rotation l/r, leg extension, leg curl, upright row, chest press, biceps curl, triceps extension, leg press    See exercise log in patient folder for rate of exertion and repetitions completed.     Assessment:   Patient tolerated all Med-X machines without any increase in pain.  PT iced lower back after the completion of all exercises.  PT is able to complete all of the exercises but has to skip some whenever he is pressed for time.  Plays tennis 3-5 times per week for 1.5 hours.      Plan:  Continue with established plan of care towards wellness goals. PT's end date will be 8/15/2019 because he had to wait a very long time between his Evaluation visit and first follow-up visit.      Health  : Liseth Mittal, PCT  12/6/2018

## 2018-12-10 ENCOUNTER — DOCUMENTATION ONLY (OUTPATIENT)
Dept: REHABILITATION | Facility: OTHER | Age: 48
End: 2018-12-10
Attending: PHYSICAL MEDICINE & REHABILITATION
Payer: COMMERCIAL

## 2018-12-10 NOTE — PROGRESS NOTES
Health  Wellness Visit Note    Name: Arely Haro  Clinic Number: [id number]  Physician: Magdalena Head, *  Diagnosis: No diagnosis found.  Past Medical History:   Diagnosis Date    Asthma     Hemochromatosis      Visit Number: 19  Precautions: Standard  6 Month: 2/15/2019  12 Month: 8/15/2019  Time In: 5:00PM  Time Out: 5:40PM  Total Treatment Time: 40 minutes    Subjective:   Patient reports he's feeling good today. Had a great weekend and was able to enjoy music in bars for 4 nights in a row before he had soreness.  Partially HEP compliant. Had a great tennis workout today.      Objective:   Arely completed therapeutic stretches (bridge with march, PPT+bridge, TAD, side bridge, multifudus, quad bird dog, ham stretch) and the following MedX exercise machines: core lumbar, torso rotation l/r, leg extension, leg curl, upright row, chest press, biceps curl, triceps extension, leg press    See exercise log in patient folder for rate of exertion and repetitions completed.     Assessment:   Patient tolerated all Med-X machines without any increase in pain.  PT iced lower back after the completion of all exercises.  PT is able to complete all of the exercises but has to skip some whenever he is pressed for time.  Plays tennis 3-5 times per week for 1.5 hours.      Plan:  Continue with established plan of care towards wellness goals. PT's end date will be 8/15/2019 because he had to wait a very long time between his Evaluation visit and first follow-up visit.      Health  : Liseth Mittal, PCT  12/10/2018

## 2018-12-13 ENCOUNTER — DOCUMENTATION ONLY (OUTPATIENT)
Dept: REHABILITATION | Facility: OTHER | Age: 48
End: 2018-12-13
Attending: PHYSICAL MEDICINE & REHABILITATION
Payer: COMMERCIAL

## 2018-12-13 NOTE — PROGRESS NOTES
Health  Wellness Visit Note    Name: Arely Haro  Clinic Number: [id number]  Physician: Magdalena Head, *  Diagnosis: No diagnosis found.  Past Medical History:   Diagnosis Date    Asthma     Hemochromatosis      Visit Number: 19  Precautions: Standard  6 Month: 2/15/2019  12 Month: 8/15/2019  Time In: 4:30PM  Time Out: 5:10PM  Total Treatment Time: 40 minutes    Subjective:   Patient reports he's feeling good today. Had a great weekend and was able to enjoy music in bars for 4 nights in a row before he had soreness.  Partially HEP compliant. Had a great tennis workout today.      Objective:   Arely completed therapeutic stretches (bridge with march, PPT+bridge, TAD, side bridge, multifudus, quad bird dog, ham stretch) and the following MedX exercise machines: core lumbar, torso rotation l/r, leg extension, leg curl, upright row, chest press, biceps curl, triceps extension, leg press    See exercise log in patient folder for rate of exertion and repetitions completed.     Assessment:   Patient tolerated all Med-X machines without any increase in pain.  PT iced lower back after the completion of all exercises.  PT is able to complete all of the exercises but has to skip some whenever he is pressed for time.  Plays tennis 3-5 times per week for 1.5 hours.      Plan:  Continue with established plan of care towards wellness goals. PT's end date will be 8/15/2019 because he had to wait a very long time between his Evaluation visit and first follow-up visit.      Health  : Grabiel Garner, PCT  12/13/2018

## 2018-12-18 ENCOUNTER — DOCUMENTATION ONLY (OUTPATIENT)
Dept: REHABILITATION | Facility: OTHER | Age: 48
End: 2018-12-18
Attending: PHYSICAL MEDICINE & REHABILITATION
Payer: COMMERCIAL

## 2018-12-18 NOTE — PROGRESS NOTES
Health  Wellness Visit Note    Name: Arely Haro  Clinic Number: [id number]  Physician: Magdalena Head, *  Diagnosis: No diagnosis found.  Past Medical History:   Diagnosis Date    Asthma     Hemochromatosis      Visit Number: 19  Precautions: Standard  6 Month: 2/15/2019  12 Month: 8/15/2019  Time In: 5:00PM  Time Out: 5:35PM  Total Treatment Time: 35 minutes    Subjective:   Patient reports feeling fatigued and sore today. He was in a tennis tournament over the weekend and his body was still recovering from it. He did not have any back pain today or during the tournament. Pt decided to scale back on some exercises today and to decrease reps today. Partially HEP compliant.       Objective:   Arely completed therapeutic stretches (bridge with march, PPT+bridge, TAD, side bridge, multifudus, quad bird dog, ham stretch) and the following MedX exercise machines: core lumbar, torso rotation l/r, leg extension, leg curl, upright row, chest press, biceps curl, triceps extension, leg press    See exercise log in patient folder for rate of exertion and repetitions completed.     Assessment:   Patient tolerated all Med-X machines without any increase in pain.  PT iced lower back after the completion of all exercises.  PT is able to complete all of the exercises but has to skip some whenever he is pressed for time.  Plays tennis 3-5 times per week for 1.5 hours.      Plan:  Continue with established plan of care towards wellness goals. PT's end date will be 8/15/2019 because he had to wait a very long time between his Evaluation visit and first follow-up visit.      Health  : Liseth Mittal, PCT  12/18/2018

## 2018-12-20 ENCOUNTER — DOCUMENTATION ONLY (OUTPATIENT)
Dept: REHABILITATION | Facility: OTHER | Age: 48
End: 2018-12-20
Attending: PHYSICAL MEDICINE & REHABILITATION
Payer: COMMERCIAL

## 2018-12-20 NOTE — PROGRESS NOTES
Health  Wellness Visit Note    Name: Arely Haro  Clinic Number: [id number]  Physician: Magdalena Head, *  Diagnosis: No diagnosis found.  Past Medical History:   Diagnosis Date    Asthma     Hemochromatosis      Visit Number: 19  Precautions: Standard  6 Month: 2/15/2019  12 Month: 8/15/2019  Time In: 5:00PM  Time Out: 5:40PM  Total Treatment Time: 40 minutes    Subjective:   Patient reports feeling much better today than the last time he was here. His muscle soreness has finally gone away. Pt will be taking two weeks off from working out because he will be going to [place] to visit his wife's family for the holidays.   Partially HEP compliant.       Objective:   Arely completed therapeutic stretches (bridge with march, PPT+bridge, TAD, side bridge, multifudus, quad bird dog, ham stretch) and the following MedX exercise machines: core lumbar, torso rotation l/r, leg extension, leg curl, upright row, chest press, biceps curl, triceps extension, leg press    See exercise log in patient folder for rate of exertion and repetitions completed.     Assessment:   Patient tolerated all Med-X machines without any increase in pain.  PT iced lower back after the completion of all exercises.  PT is able to complete all of the exercises but has to skip some whenever he is pressed for time.  Plays tennis 3-5 times per week for 1.5 hours.      Plan:  Continue with established plan of care towards wellness goals. PT's end date will be 8/15/2019 because he had to wait a very long time between his Evaluation visit and first follow-up visit.      Health  : Liseth Mittal, PCT  12/20/2018

## 2019-01-09 ENCOUNTER — DOCUMENTATION ONLY (OUTPATIENT)
Dept: REHABILITATION | Facility: OTHER | Age: 49
End: 2019-01-09
Attending: PHYSICAL MEDICINE & REHABILITATION
Payer: COMMERCIAL

## 2019-01-09 NOTE — PROGRESS NOTES
Health  Wellness Visit Note    Name: Arely Haro  Clinic Number: [id number]  Physician: Magdalena Head, *  Diagnosis: No diagnosis found.  Past Medical History:   Diagnosis Date    Asthma     Hemochromatosis      Visit Number: 19  Precautions: Standard  6 Month: 2/15/2019  12 Month: 8/15/2019  Time In: 5:05PM  Time Out: 5:45PM  Total Treatment Time: 40 minutes    Subjective:   Patient reports feeling ok today, he has been away for 2 weeks on vacation. While away he did not do any weight lifting but did do some swimming and scuba diving. Did not have any problems with his lower back. He was partially compliant with stretches. Felt good after exercising.     Objective:   Arely completed therapeutic stretches (bridge with march, PPT+bridge, TAD, side bridge, multifudus, quad bird dog, ham stretch) and the following MedX exercise machines: core lumbar, torso rotation l/r, leg extension, leg curl, upright row, chest press, biceps curl, triceps extension, leg press    See exercise log in patient folder for rate of exertion and repetitions completed.     Assessment:   Patient tolerated all Med-X machines without any increase in pain.  PT iced lower back after the completion of all exercises.  PT is able to complete all of the exercises but has to skip some whenever he is pressed for time.  Plays tennis 3-5 times per week for 1.5 hours.      Plan:  Continue with established plan of care towards wellness goals. PT's end date will be 8/15/2019 because he had to wait a very long time between his Evaluation visit and first follow-up visit.      Health  : Anish Lundy  1/9/2019

## 2019-01-15 ENCOUNTER — DOCUMENTATION ONLY (OUTPATIENT)
Dept: REHABILITATION | Facility: OTHER | Age: 49
End: 2019-01-15
Attending: PHYSICAL MEDICINE & REHABILITATION
Payer: COMMERCIAL

## 2019-01-15 NOTE — PROGRESS NOTES
Health  Wellness Visit Note    Name: Arely Haro  Clinic Number: [id number]  Physician: Magdalena Head, *  Diagnosis: No diagnosis found.  Past Medical History:   Diagnosis Date    Asthma     Hemochromatosis      Visit Number: 20  Precautions: Standard  6 Month: 2/15/2019  12 Month: 8/15/2019  Time In: 5:00PM  Time Out: 5:40PM  Total Treatment Time: 40 minutes    Subjective:   Patient reports feeling well today. He did not have any back pain today and his back has been feeling well. Pt has started up tennis again, and played today. He hopes to join a league with people in his age group in order to prepare for another tournament.  Felt good after exercising.     Objective:   Arely completed therapeutic stretches (bridge with march, PPT+bridge, TAD, side bridge, multifudus, quad bird dog, ham stretch) and the following MedX exercise machines: core lumbar, torso rotation l/r, leg extension, leg curl, upright row, chest press, biceps curl, triceps extension, leg press    See exercise log in patient folder for rate of exertion and repetitions completed.     Assessment:   Patient tolerated all Med-X machines without any increase in pain.  PT iced lower back after the completion of all exercises.  PT is able to complete all of the exercises but has to skip some whenever he is pressed for time.  Plays tennis 3-5 times per week for 1.5 hours.      Plan:  Continue with established plan of care towards wellness goals. PT's end date will be 8/15/2019 because he had to wait a very long time between his Evaluation visit and first follow-up visit.      Health  : Liseth Mittal, PCT  1/15/2019

## 2019-01-21 ENCOUNTER — DOCUMENTATION ONLY (OUTPATIENT)
Dept: REHABILITATION | Facility: OTHER | Age: 49
End: 2019-01-21
Attending: PHYSICAL MEDICINE & REHABILITATION
Payer: COMMERCIAL

## 2019-01-21 NOTE — PROGRESS NOTES
Health  Wellness Visit Note    Name: Arely Haro  Clinic Number: [id number]  Physician: Magdalena Head, *  Diagnosis: No diagnosis found.  Past Medical History:   Diagnosis Date    Asthma     Hemochromatosis      Visit Number: 21  Precautions: Standard  6 Month: 2/15/2019  12 Month: 8/15/2019  Time In: 5:00PM  Time Out: 5:40PM  Total Treatment Time: 40 minutes    Subjective:   Patient reports feeling well today. He did not have any back pain today and his back has been feeling well. Pt is playing tennis again. Has not found a league to do a tennis tournament yet. HEP compliant. Wants to increase mos t of his weights next session.     Objective:   Arely completed therapeutic stretches (bridge with march, PPT+bridge, TAD, side bridge, multifudus, quad bird dog, ham stretch) and the following MedX exercise machines: core lumbar, torso rotation l/r, leg extension, leg curl, upright row, chest press, biceps curl, triceps extension, leg press    See exercise log in patient folder for rate of exertion and repetitions completed.     Assessment:   Patient tolerated all Med-X machines without any increase in pain.  PT iced lower back after the completion of all exercises.  PT is able to complete all of the exercises but has to skip some whenever he is pressed for time.  Plays tennis 3-5 times per week for 1.5 hours.      Plan:  Continue with established plan of care towards wellness goals. PT's end date will be 8/15/2019 because he had to wait a very long time between his Evaluation visit and first follow-up visit.      Health  : Anish Lundy  1/21/2019

## 2019-01-24 ENCOUNTER — DOCUMENTATION ONLY (OUTPATIENT)
Dept: REHABILITATION | Facility: OTHER | Age: 49
End: 2019-01-24
Attending: PHYSICAL MEDICINE & REHABILITATION
Payer: COMMERCIAL

## 2019-01-24 NOTE — PROGRESS NOTES
Health  Wellness Visit Note    Name: Arely Haro  Clinic Number: [id number]  Physician: Magdalena Head, *  Diagnosis: No diagnosis found.  Past Medical History:   Diagnosis Date    Asthma     Hemochromatosis      Visit Number: 26  Precautions: Standard  6 Month: 2/15/2019  12 Month: 8/15/2019  Time In: 5:00PM  Time Out: 5:45PM  Total Treatment Time: 45 minutes    Subjective:   Patient reports feeling well today. He did not have any back pain today and his back has been feeling well. He thinks he's found a great balance of getting stronger, doing the things he loves, and not having pain doing them. Pt is playing tennis again, and has joined a tennis league. HEP compliant.     Objective:   Arely completed therapeutic stretches (bridge with march, PPT+bridge, TAD, side bridge, multifudus, quad bird dog, ham stretch) and the following MedX exercise machines: core lumbar, torso rotation l/r, leg extension, leg curl, upright row, chest press, biceps curl, triceps extension, leg press    See exercise log in patient folder for rate of exertion and repetitions completed.     Assessment:   Patient tolerated all Med-X machines without any increase in pain.  PT iced lower back after the completion of all exercises.  PT is able to complete all of the exercises but has to skip some whenever he is pressed for time.  Plays tennis 3-5 times per week for 1.5 hours.      Plan:  Continue with established plan of care towards wellness goals. PT's end date will be 8/15/2019 because he had to wait a very long time between his Evaluation visit and first follow-up visit.      Health  : Liseth Mittal, PCT  1/24/2019

## 2019-01-28 ENCOUNTER — DOCUMENTATION ONLY (OUTPATIENT)
Dept: REHABILITATION | Facility: OTHER | Age: 49
End: 2019-01-28
Attending: PHYSICAL MEDICINE & REHABILITATION
Payer: COMMERCIAL

## 2019-01-28 NOTE — PROGRESS NOTES
Health  Wellness Visit Note    Name: Arely Haro  Clinic Number: [id number]  Physician: Magdalena Head, *  Diagnosis: No diagnosis found.  Past Medical History:   Diagnosis Date    Asthma     Hemochromatosis      Visit Number: 26  Precautions: Standard  6 Month: 2/15/2019  12 Month: 8/15/2019  Time In: 5:05PM  Time Out: 5:40PM  Total Treatment Time: 35 minutes    Subjective:   Patient reports feeling well today. He did not have any back pain today and his back has been feeling well. Pushed his self doing weights today. He played tennis everyday over the weekend, enjoying his new league. HEP compliant.     Objective:   Arely completed therapeutic stretches (bridge with march, PPT+bridge, TAD, side bridge, multifudus, quad bird dog, ham stretch) and the following MedX exercise machines: core lumbar, torso rotation l/r, leg extension, leg curl, upright row, chest press, biceps curl, triceps extension, leg press    See exercise log in patient folder for rate of exertion and repetitions completed.     Assessment:   Patient tolerated all Med-X machines without any increase in pain.  PT iced lower back after the completion of all exercises.  PT is able to complete all of the exercises but has to skip some whenever he is pressed for time.  Plays tennis 3-5 times per week for 1.5 hours.      Plan:  Continue with established plan of care towards wellness goals. PT's end date will be 8/15/2019 because he had to wait a very long time between his Evaluation visit and first follow-up visit.      Health  : Anish Lundy  1/28/2019

## 2019-01-30 ENCOUNTER — DOCUMENTATION ONLY (OUTPATIENT)
Dept: REHABILITATION | Facility: OTHER | Age: 49
End: 2019-01-30
Attending: PHYSICAL MEDICINE & REHABILITATION
Payer: COMMERCIAL

## 2019-01-30 NOTE — PROGRESS NOTES
Health  Wellness Visit Note    Name: Arely Haro  Clinic Number: [id number]  Physician: Magdalena Head, *  Diagnosis: No diagnosis found.  Past Medical History:   Diagnosis Date    Asthma     Hemochromatosis      Visit Number: 27  Precautions: Standard  6 Month: 2/15/2019  12 Month: 8/15/2019  Time In: 5:05PM  Time Out: 5:40PM  Total Treatment Time: 35 minutes    Subjective:   Patient reports feeling well today. He did not have any back pain today and his back has been feeling well. Playing a tennis match after his wellness session [employer] will once a week up until April. HEP compliant.     Objective:   Arely completed therapeutic stretches (bridge with march, PPT+bridge, TAD, side bridge, multifudus, quad bird dog, ham stretch) and the following MedX exercise machines: core lumbar, torso rotation l/r, leg extension, leg curl, upright row, chest press, biceps curl, triceps extension, leg press    See exercise log in patient folder for rate of exertion and repetitions completed.     Assessment:   Patient tolerated all Med-X machines without any increase in pain.  PT iced lower back after the completion of all exercises.  PT is able to complete all of the exercises but has to skip some whenever he is pressed for time.  Plays tennis 3-5 times per week for 1.5 hours.      Plan:  Continue with established plan of care towards wellness goals. PT's end date will be 8/15/2019 because he had to wait a very long time between his Evaluation visit and first follow-up visit.      Health  : Anish Lundy  1/30/2019

## 2019-02-04 ENCOUNTER — DOCUMENTATION ONLY (OUTPATIENT)
Dept: REHABILITATION | Facility: OTHER | Age: 49
End: 2019-02-04
Attending: PHYSICAL MEDICINE & REHABILITATION
Payer: COMMERCIAL

## 2019-02-04 NOTE — PROGRESS NOTES
Health  Wellness Visit Note    Name: Arely Haro  Clinic Number: [id number]  Physician: Magdalena Head, *  Diagnosis: No diagnosis found.  Past Medical History:   Diagnosis Date    Asthma     Hemochromatosis      Visit Number: 30  Precautions: Standard  6 Month: 2/15/2019  12 Month: 8/15/2019  Time In: 5:05PM  Time Out: 5:50PM  Total Treatment Time: 45 minutes    Subjective:   Patient reports feeling well today. Hew was tired and sore beginning of the weekend. He played tennis twice and exercised in  2 days. He did not have any back trouble though. Staying HEP compliant. [place] fine after session.     Objective:   Arely completed therapeutic stretches (bridge with march, PPT+bridge, TAD, side bridge, multifudus, quad bird dog, ham stretch) and the following MedX exercise machines: core lumbar, torso rotation l/r, leg extension, leg curl, upright row, chest press, biceps curl, triceps extension, leg press    See exercise log in patient folder for rate of exertion and repetitions completed.     Assessment:   Patient tolerated all Med-X machines without any increase in pain.  PT skipped stretching and icing. iced lower back after the completion of all exercises. .  Plays tennis 3-5 times per week for 1.5 hours.      Plan:  Continue with established plan of care towards wellness goals. PT's end date will be 8/15/2019 because he had to wait a very long time between his Evaluation visit and first follow-up visit.      Health  : Anish Lundy  2/4/2019

## 2019-02-06 ENCOUNTER — DOCUMENTATION ONLY (OUTPATIENT)
Dept: REHABILITATION | Facility: OTHER | Age: 49
End: 2019-02-06
Attending: PHYSICAL MEDICINE & REHABILITATION
Payer: COMMERCIAL

## 2019-02-06 NOTE — PROGRESS NOTES
Health  Wellness Visit Note    Name: Arely Haro  Clinic Number: [id number]  Physician: Magdalena Head, *  Diagnosis: No diagnosis found.  Past Medical History:   Diagnosis Date    Asthma     Hemochromatosis      Visit Number: 31  Precautions: Standard  6 Month: 2/15/2019  12 Month: 8/15/2019  Time In: 8:00AM  Time Out: 8:40AM  Total Treatment Time: 40 minutes    Subjective:   Patient reports feeling well today. He did not have any pain in his low back, but he was feeling some pain and tightness in his right upper back. He thinks he may have slept wrong.  Hew was tired and sore beginning of the weekend. He played tennis twice and exercised in  2 days. He's enjoying his tennis league and he has another match tonight. Staying HEP compliant. [place] fine after session.     Objective:   Arely completed therapeutic stretches (bridge with march, PPT+bridge, TAD, side bridge, multifudus, quad bird dog, ham stretch) and the following MedX exercise machines: core lumbar, torso rotation l/r, leg extension, leg curl, upright row, chest press, biceps curl, triceps extension, leg press    See exercise log in patient folder for rate of exertion and repetitions completed.     Assessment:   Patient tolerated all Med-X machines without any increase in pain.  PT skipped stretching and icing. iced lower back after the completion of all exercises. .  Plays tennis 3-5 times per week for 1.5 hours.      Plan:  Continue with established plan of care towards wellness goals. PT's end date will be 8/15/2019 because he had to wait a very long time between his Evaluation visit and first follow-up visit.      Health  : Liseth Mittal, PCT  2/6/2019

## 2019-02-11 ENCOUNTER — DOCUMENTATION ONLY (OUTPATIENT)
Dept: REHABILITATION | Facility: OTHER | Age: 49
End: 2019-02-11
Attending: PHYSICAL MEDICINE & REHABILITATION
Payer: COMMERCIAL

## 2019-02-11 NOTE — PROGRESS NOTES
Health  Wellness Visit Note    Name: Arely Haro  Clinic Number: [id number]  Physician: Magdalena Head, *  Diagnosis: No diagnosis found.  Past Medical History:   Diagnosis Date    Asthma     Hemochromatosis      Visit Number: 31  Precautions: Standard  6 Month: 2/15/2019  12 Month: 8/15/2019  Time In: 8:00AM  Time Out: 8:40AM  Total Treatment Time: 40 minutes    Subjective:   Patient reports feeling well today. He did not have any pain in his low back. He thinks he's doing everything he needs to and that the program is working for him. He was in the [place] parade and he said he was able to dance for 4 hours without any back pain. He thought it was a good test for his back. Tennis has been good, and he won his match last week. Staying HEP compliant. [place] fine after session.     Objective:   Arely completed therapeutic stretches (bridge with march, PPT+bridge, TAD, side bridge, multifudus, quad bird dog, ham stretch) and the following MedX exercise machines: core lumbar, torso rotation l/r, leg extension, leg curl, upright row, chest press, biceps curl, triceps extension, leg press    See exercise log in patient folder for rate of exertion and repetitions completed.     Assessment:   Patient tolerated all Med-X machines without any increase in pain.  PT skipped stretching and icing. iced lower back after the completion of all exercises. .  Plays tennis 3-5 times per week for 1.5 hours.      Plan:  Continue with established plan of care towards wellness goals. PT's end date will be 8/15/2019 because he had to wait a very long time between his Evaluation visit and first follow-up visit.      Health  : Liseth Mittal, PCT  2/11/2019

## 2019-02-14 ENCOUNTER — DOCUMENTATION ONLY (OUTPATIENT)
Dept: REHABILITATION | Facility: OTHER | Age: 49
End: 2019-02-14
Attending: PHYSICAL MEDICINE & REHABILITATION
Payer: COMMERCIAL

## 2019-02-14 NOTE — PROGRESS NOTES
Health  Wellness Visit Note    Name: Arely Haro  Clinic Number: [id number]  Physician: Magdalena Head, *  Diagnosis: No diagnosis found.  Past Medical History:   Diagnosis Date    Asthma     Hemochromatosis      Visit Number: 31  Precautions: Standard  6 Month: 2/15/2019  12 Month: 8/15/2019  Time In: 5:00PM  Time Out: 5:40PM  Total Treatment Time: 40 minutes    Subjective:   Patient reports feeling well today. He did not have any pain in his low back. He thinks he's doing everything he needs to and that the program is working for him. He increased on 4 of the machines today; felt good after. Tennis has been good, he played this morning; says the weather has been perfect for playing. He is staying HEP compliant. [place] fine after session.     Objective:   Arely completed therapeutic stretches (bridge with march, PPT+bridge, TAD, side bridge, multifudus, quad bird dog, ham stretch) and the following MedX exercise machines: core lumbar, torso rotation l/r, leg extension, leg curl, upright row, chest press, biceps curl, triceps extension, leg press    See exercise log in patient folder for rate of exertion and repetitions completed.     Assessment:   Patient tolerated all Med-X machines without any increase in pain.  PT skipped stretching and icing. iced lower back after the completion of all exercises. .  Plays tennis 3-5 times per week for 1.5 hours.      Plan:  Continue with established plan of care towards wellness goals. PT's end date will be 8/15/2019 because he had to wait a very long time between his Evaluation visit and first follow-up visit.      Health  : Grabiel Garner, PCT  2/14/2019

## 2019-02-18 ENCOUNTER — DOCUMENTATION ONLY (OUTPATIENT)
Dept: REHABILITATION | Facility: OTHER | Age: 49
End: 2019-02-18
Attending: PHYSICAL MEDICINE & REHABILITATION
Payer: COMMERCIAL

## 2019-02-18 NOTE — PROGRESS NOTES
Health  Wellness Visit Note    Name: Arely Haro  Clinic Number: [id number]  Physician: Magdalena Head, *  Diagnosis: No diagnosis found.  Past Medical History:   Diagnosis Date    Asthma     Hemochromatosis      Visit Number: 32  Precautions: Standard  6 Month: 2/15/2019  12 Month: 8/15/2019  Time In: 5:00PM  Time Out: 5:40PM  Total Treatment Time:  40minutes    Subjective:   Patient reports feeling pretty well currently. Played tennis this morning, did not have any problems. Says he tries to play tennis each day as long as it is joaquina. Constantly pushing hisself to increase weights when he can.  He is staying HEP compliant. [place] fine after session. Pt will do IM testing this month.    Objective:   Arely completed therapeutic stretches (bridge with march, PPT+bridge, TAD, side bridge, multifudus, quad bird dog, ham stretch) and the following MedX exercise machines: core lumbar, torso rotation l/r, leg extension, leg curl, upright row, chest press, biceps curl, triceps extension, leg press    See exercise log in patient folder for rate of exertion and repetitions completed.     Assessment:   Patient tolerated all Med-X machines without any increase in pain.  PT skipped stretching and icing. iced lower back after the completion of all exercises.   Plays tennis 3-6 times per week for 1.5 hours.      Plan:  Continue with established plan of care towards wellness goals. PT's end date will be 8/15/2019 because he had to wait a very long time between his Evaluation visit and first follow-up visit. Entered in [employer].    Health  : Anish Lundy  2/18/2019

## 2019-02-20 ENCOUNTER — DOCUMENTATION ONLY (OUTPATIENT)
Dept: REHABILITATION | Facility: OTHER | Age: 49
End: 2019-02-20
Attending: PHYSICAL MEDICINE & REHABILITATION
Payer: COMMERCIAL

## 2019-02-20 NOTE — PROGRESS NOTES
Health  Wellness Visit Note    Name: Arely Haro  Clinic Number: [id number]  Physician: Magdalena Head, *  Diagnosis: No diagnosis found.  Past Medical History:   Diagnosis Date    Asthma     Hemochromatosis      Visit Number: 33  Precautions: Standard  6 Month: 2/15/2019  12 Month: 8/15/2019  Time In: 5:05PM  Time Out: 5:35PM  Total Treatment Time:  30 minutes    Subjective:   Patient reports feeling pretty well. Not having any current pain. Disappointed because he has not played tennis in a few days and more then likely will be rained out a few more days. Constantly pushing hisself to increase weights when he can.  He is staying HEP compliant. [place] fine after session. Pt will do IM testing this month.    Objective:   Arely completed therapeutic stretches (bridge with march, PPT+bridge, TAD, side bridge, multifudus, quad bird dog, ham stretch) and the following MedX exercise machines: core lumbar, torso rotation l/r, leg extension, leg curl, upright row, chest press, biceps curl, triceps extension, leg press    See exercise log in patient folder for rate of exertion and repetitions completed.     Assessment:   Patient tolerated all Med-X machines without any increase in pain.  PT skipped stretching and icing. iced lower back after the completion of all exercises.   Plays tennis 3-6 times per week for 1.5 hours.      Plan:  Continue with established plan of care towards wellness goals. PT's end date will be 8/15/2019 because he had to wait a very long time between his Evaluation visit and first follow-up visit. Entered in [employer].    Health  : Anish Lundy  2/20/2019

## 2019-02-25 ENCOUNTER — DOCUMENTATION ONLY (OUTPATIENT)
Dept: REHABILITATION | Facility: OTHER | Age: 49
End: 2019-02-25
Attending: PHYSICAL MEDICINE & REHABILITATION
Payer: COMMERCIAL

## 2019-02-25 NOTE — PROGRESS NOTES
Health  Wellness Visit Note    Name: Arely Haro  Clinic Number: [id number]  Physician: Magdalena Head, *  Diagnosis: No diagnosis found.  Past Medical History:   Diagnosis Date    Asthma     Hemochromatosis      Visit Number: 34  Precautions: Standard  6 Month: 2/15/2019  12 Month: 8/15/2019  Time In: 4:30PM  Time Out: 5:15PM  Total Treatment Time:  45 minutes    Subjective:   Patient reports feeling pretty well. Not having any current pain. He had a good weekend. Sppent most of his time at the [employer] and at [employer]. His back was feeling fine. He was also able to play tennis this morning.  Constantly pushing himself to increase weights when he can.  He is staying HEP compliant. [place] fine after session. Pt will do IM testing this month.    Objective:   Arely completed therapeutic stretches (bridge with march, PPT+bridge, TAD, side bridge, multifudus, quad bird dog, ham stretch) and the following MedX exercise machines: core lumbar, torso rotation l/r, leg extension, leg curl, upright row, chest press, biceps curl, triceps extension, leg press    See exercise log in patient folder for rate of exertion and repetitions completed.     Assessment:   Patient tolerated all Med-X machines without any increase in pain.  PT skipped stretching and icing. iced lower back after the completion of all exercises.   Plays tennis 3-6 times per week for 1.5 hours.      Plan:  Continue with established plan of care towards wellness goals. PT's end date will be 8/15/2019 because he had to wait a very long time between his Evaluation visit and first follow-up visit. Entered in [employer].    Health  : Liseth Mittal, PCT  2/25/2019

## 2019-02-28 ENCOUNTER — DOCUMENTATION ONLY (OUTPATIENT)
Dept: REHABILITATION | Facility: OTHER | Age: 49
End: 2019-02-28
Attending: PHYSICAL MEDICINE & REHABILITATION
Payer: COMMERCIAL

## 2019-02-28 NOTE — PROGRESS NOTES
Health  Wellness Visit Note    Name: Arely Haro  Clinic Number: [id number]  Physician: Magdalena Head, *  Diagnosis: No diagnosis found.  Past Medical History:   Diagnosis Date    Asthma     Hemochromatosis      Visit Number: 35  Precautions: Standard  6 Month: 2/15/2019  12 Month: 8/15/2019  Time In: 8:00AM  Time Out: 8:40AM  Total Treatment Time:  40 minutes    Subjective:   Patient reports feeling pretty well,, 2/10 pain, says its nothing major though. He did want to stop exorcising after doing lower body workouts, felt a knot in his R shoulder. Riding in a parade Saturday and does not want to injure his self. Will try to play tennis this week. Completed 6 month IM. He is staying HEP compliant. [place] fine after session. Pt will do IM testing this month.    Objective:   Arely completed therapeutic stretches (bridge with march, PPT+bridge, TAD, side bridge, multifudus, quad bird dog, ham stretch) and the following MedX exercise machines: core lumbar, torso rotation l/r, leg extension, leg curl, upright row, chest press, biceps curl, triceps extension, leg press    See exercise log in patient folder for rate of exertion and repetitions completed.     Assessment:   Patient tolerated all Med-X machines without any increase in pain.  PT skipped stretching and icing. iced lower back after the completion of all exercises.   Plays tennis 3-6 times per week for 1.5 hours.      Plan:  Continue with established plan of care towards wellness goals. PT's end date will be 8/15/2019 because he had to wait a very long time between his Evaluation visit and first follow-up visit. Entered in [facility].    Health  : Anish Lundy  2/28/2019

## 2019-03-11 ENCOUNTER — DOCUMENTATION ONLY (OUTPATIENT)
Dept: REHABILITATION | Facility: OTHER | Age: 49
End: 2019-03-11
Attending: PHYSICAL MEDICINE & REHABILITATION
Payer: COMMERCIAL

## 2019-03-11 NOTE — PROGRESS NOTES
Health  Wellness Visit Note    Name: Arely Haro  Clinic Number: [id number]  Physician: Magdalena Head, *  Diagnosis: No diagnosis found.  Past Medical History:   Diagnosis Date    Asthma     Hemochromatosis      Visit Number: 36  Precautions: Standard  6 Month: 2/15/2019  12 Month: 8/15/2019  Time In: 5:00PM  Time Out:5:45PM  Total Treatment Time:  45 minutes    Subjective:   Patient reports feeling well today. Has not exercised in about 2 weeks, he was on vacation and also rode in a parade. Reports having no increase in back symptoms, in fact he felt great. Did not have any problems after his 6 month IM testing. HEP compliant.     Objective:   Arely completed therapeutic stretches (bridge with march, PPT+bridge, TAD, side bridge, multifudus, quad bird dog, ham stretch) and the following MedX exercise machines: core lumbar, torso rotation l/r, leg extension, leg curl, upright row, chest press, biceps curl, triceps extension, leg press    See exercise log in patient folder for rate of exertion and repetitions completed.     Assessment:   Patient tolerated all Med-X machines without any increase in pain.  PT skipped stretching and icing. iced lower back after the completion of all exercises.   Plays tennis 3-6 times per week for 1.5 hours.      Plan:  Continue with established plan of care towards wellness goals. PT's end date will be 8/15/2019 because he had to wait a very long time between his Evaluation visit and first follow-up visit. Entered in [employer].    Health  : Anish Lundy  3/11/2019

## 2019-03-15 ENCOUNTER — DOCUMENTATION ONLY (OUTPATIENT)
Dept: REHABILITATION | Facility: OTHER | Age: 49
End: 2019-03-15
Attending: PHYSICAL MEDICINE & REHABILITATION
Payer: COMMERCIAL

## 2019-03-15 NOTE — PROGRESS NOTES
Health  Wellness Visit Note    Name: Arely Haro  Clinic Number: [id number]  Physician: Magdalena Head, *  Diagnosis: No diagnosis found.  Past Medical History:   Diagnosis Date    Asthma     Hemochromatosis      Visit Number: 37  Precautions: Standard  6 Month: 2/15/2019  12 Month: 8/15/2019  Time In: 7:30AM  Time Out:7:55AM  Total Treatment Time: 25minutes    Subjective:   Patient reports feeling pretty good today, had a little soreness. Usually does not work out early in the AM. He is going paly tennis after his session today. Encouraged patient to ice later today. Does not report any changes in back symptoms. May go to a parade this weekend. HEP compliant.     Objective:   Arely completed therapeutic stretches (bridge with march, PPT+bridge, TAD, side bridge, multifudus, quad bird dog, ham stretch) and the following MedX exercise machines: core lumbar, torso rotation l/r, leg extension, leg curl, upright row, chest press, biceps curl, triceps extension, leg press    See exercise log in patient folder for rate of exertion and repetitions completed.     Assessment:   Patient tolerated all Med-X machines without any increase in pain.  PT skipped stretching and icing. iced lower back after the completion of all exercises.   Plays tennis 3-6 times per week for 1.5 hours.      Plan:  Continue with established plan of care towards wellness goals. PT's end date will be 8/15/2019 because he had to wait a very long time between his Evaluation visit and first follow-up visit. Entered in [employer].    Health  : Anish Lundy  3/15/2019

## 2019-03-20 ENCOUNTER — DOCUMENTATION ONLY (OUTPATIENT)
Dept: REHABILITATION | Facility: OTHER | Age: 49
End: 2019-03-20
Attending: PHYSICAL MEDICINE & REHABILITATION
Payer: COMMERCIAL

## 2019-03-20 NOTE — PROGRESS NOTES
Health  Wellness Visit Note    Name: Arely Haro  Clinic Number: [id number]  Physician: Magdalena Head, *  Diagnosis: No diagnosis found.  Past Medical History:   Diagnosis Date    Asthma     Hemochromatosis      Visit Number: 38  Precautions: Standard  6 Month: 2/15/2019  12 Month: 8/15/2019  Time In: 5:05PM  Time Out:7:40M  Total Treatment Time: 35minutes    Subjective:   Patient reports feeling  Very tired today, played about 2 hours of tennis earlier in the day, also felt hot during session. Encouraged patient to do some extra icing if need be. Probably will play tennis this weekend as well. Staying HEP compliant. Back is doing well, beside the soreness. HEP compliant.     Objective:   Arely completed therapeutic stretches (bridge with march, PPT+bridge, TAD, side bridge, multifudus, quad bird dog, ham stretch) and the following MedX exercise machines: core lumbar, torso rotation l/r, leg extension, leg curl, upright row, chest press, biceps curl, triceps extension, leg press    See exercise log in patient folder for rate of exertion and repetitions completed.     Assessment:   Patient tolerated all Med-X machines without any increase in pain.  PT skipped stretching and icing. iced lower back after the completion of all exercises.   Plays tennis 3-6 times per week for 1.5 hours.      Plan:  Continue with established plan of care towards wellness goals. PT's end date will be 8/15/2019 because he had to wait a very long time between his Evaluation visit and first follow-up visit. Entered in "[employer]    Health  : Anish Lundy  3/20/2019

## 2019-03-22 ENCOUNTER — DOCUMENTATION ONLY (OUTPATIENT)
Dept: REHABILITATION | Facility: OTHER | Age: 49
End: 2019-03-22
Attending: PHYSICAL MEDICINE & REHABILITATION
Payer: COMMERCIAL

## 2019-03-22 NOTE — PROGRESS NOTES
Health  Wellness Visit Note    Name: Arely Haro  Clinic Number: [id number]  Physician: Magdalena Head, *  Diagnosis: No diagnosis found.  Past Medical History:   Diagnosis Date    Asthma     Hemochromatosis      Visit Number: 39  Precautions: Standard  6 Month: 2/15/2019  12 Month: 8/15/2019  Time In: 8:00AM  Time Out:8:40AM  Total Treatment Time: 40minutes    Subjective:   Patient reports feeling pretty well today. He was not having any back pain at all. He said his tennis league has been going well, but it's much more challenging than he thought it would be. He is enjoying it however, and he's not having any pain doing it. HEP compliant.     Objective:   Arely completed therapeutic stretches (bridge with march, PPT+bridge, TAD, side bridge, multifudus, quad bird dog, ham stretch) and the following MedX exercise machines: core lumbar, torso rotation l/r, leg extension, leg curl, upright row, chest press, biceps curl, triceps extension, leg press    See exercise log in patient folder for rate of exertion and repetitions completed.     Assessment:   Patient tolerated all Med-X machines without any increase in pain.  PT skipped stretching and icing. iced lower back after the completion of all exercises.   Plays tennis 3-6 times per week for 1.5 hours.      Plan:  Continue with established plan of care towards wellness goals. PT's end date will be 8/15/2019 because he had to wait a very long time between his Evaluation visit and first follow-up visit. Entered in [employer].    Health  : Liseth Mittal, PCT  3/22/2019

## 2019-03-28 ENCOUNTER — DOCUMENTATION ONLY (OUTPATIENT)
Dept: REHABILITATION | Facility: OTHER | Age: 49
End: 2019-03-28
Attending: PHYSICAL MEDICINE & REHABILITATION
Payer: COMMERCIAL

## 2019-03-28 NOTE — PROGRESS NOTES
Health  Wellness Visit Note    Name: Arely Haro  Clinic Number: [id number]  Physician: Magdalena Head, *  Diagnosis: No diagnosis found.  Past Medical History:   Diagnosis Date    Asthma     Hemochromatosis      Visit Number: 40  Precautions: Standard  6 Month: 2/15/2019  12 Month: 8/15/2019  Time In: 5:00M  Time Out:5:40AM  Total Treatment Time: 40minutes    Subjective:   Patient reports feeling well today. He was not having any back pain at all, but he was feeling very tired. He had a very long tennis meet last night, another one this morning, and he has another one tomorrow. He also plans to go fishing tomorrow. And he also is helping his friend move this weekend.  HEP compliant.     Objective:   Arely completed therapeutic stretches (bridge with march, PPT+bridge, TAD, side bridge, multifudus, quad bird dog, ham stretch) and the following MedX exercise machines: core lumbar, torso rotation l/r, leg extension, leg curl, upright row, chest press, biceps curl, triceps extension, leg press    See exercise log in patient folder for rate of exertion and repetitions completed.     Assessment:   Patient tolerated all Med-X machines without any increase in pain.  PT skipped stretching and icing. iced lower back after the completion of all exercises.   Plays tennis 3-6 times per week for 1.5 hours.      Plan:  Continue with established plan of care towards wellness goals. PT's end date will be 8/15/2019 because he had to wait a very long time between his Evaluation visit and first follow-up visit. Entered in [employer].    Health  : Liseth Mittal, PCT  3/28/2019

## 2019-04-01 ENCOUNTER — DOCUMENTATION ONLY (OUTPATIENT)
Dept: REHABILITATION | Facility: OTHER | Age: 49
End: 2019-04-01
Attending: PHYSICAL MEDICINE & REHABILITATION
Payer: COMMERCIAL

## 2019-04-01 NOTE — PROGRESS NOTES
Health  Wellness Visit Note    Name: Arely Haro  Clinic Number: [id number]  Physician: Magdalena Head, *  Diagnosis: No diagnosis found.  Past Medical History:   Diagnosis Date    Asthma     Hemochromatosis      Visit Number: 40  Precautions: Standard  6 Month: 2/15/2019  12 Month: 8/15/2019  Time In: 7:40AM  Time Out:8:10AM  Total Treatment Time: 30minutes    Subjective:   Patient reports feeling well today. He had a good weekend. He was lifting and helping his friend move over the weekend. He said he was very tired after and his back was a little sore and fatigued later on. However, he was feeling much better the next day.  HEP compliant.     Objective:   Arely completed therapeutic stretches (bridge with march, PPT+bridge, TAD, side bridge, multifudus, quad bird dog, ham stretch) and the following MedX exercise machines: core lumbar, torso rotation l/r, leg extension, leg curl, upright row, chest press, biceps curl, triceps extension, leg press    See exercise log in patient folder for rate of exertion and repetitions completed.     Assessment:   Patient tolerated all Med-X machines without any increase in pain.  PT skipped stretching and icing. iced lower back after the completion of all exercises.   Plays tennis 3-6 times per week for 1.5 hours.      Plan:  Continue with established plan of care towards wellness goals. PT's end date will be 8/15/2019 because he had to wait a very long time between his Evaluation visit and first follow-up visit. Entered in [employer].    Health  : Liseth Mittal, PCT  4/1/2019

## 2019-04-03 ENCOUNTER — DOCUMENTATION ONLY (OUTPATIENT)
Dept: REHABILITATION | Facility: OTHER | Age: 49
End: 2019-04-03
Attending: PHYSICAL MEDICINE & REHABILITATION
Payer: COMMERCIAL

## 2019-04-03 NOTE — PROGRESS NOTES
Health  Wellness Visit Note    Name: Arely Haro  Clinic Number: [id number]  Physician: Magdalena Head, *  Diagnosis: No diagnosis found.  Past Medical History:   Diagnosis Date    Asthma     Hemochromatosis      Visit Number: 41  Precautions: Standard  6 Month: 2/15/2019  12 Month: 8/15/2019  Time In: 8:00AM  Time Out:8:40AM  Total Treatment Time: 40 minutes    Subjective:   Patient reports feeling well today. He had a good weekend. He was lifting and helping his friend move over the weekend. He said he was very tired after and his back was a little sore and fatigued later on. However, he was feeling much better the next day. HEP compliant.     Objective:   Arely completed therapeutic stretches (bridge with march, PPT+bridge, TAD, side bridge, multifudus, quad bird dog, ham stretch) and the following MedX exercise machines: core lumbar, torso rotation l/r, leg extension, leg curl, upright row, chest press, biceps curl, triceps extension, leg press    See exercise log in patient folder for rate of exertion and repetitions completed.     Assessment:   Patient tolerated all Med-X machines without any increase in pain.  PT skipped stretching and icing. iced lower back after the completion of all exercises.   Plays tennis 3-6 times per week for 1.5 hours.      Plan:  Continue with established plan of care towards wellness goals. PT's end date will be 8/15/2019 because he had to wait a very long time between his Evaluation visit and first follow-up visit. Entered in [employer].    Health  : Grabiel Garner, PCT  4/3/2019

## 2019-04-08 ENCOUNTER — DOCUMENTATION ONLY (OUTPATIENT)
Dept: REHABILITATION | Facility: OTHER | Age: 49
End: 2019-04-08
Attending: PHYSICAL MEDICINE & REHABILITATION
Payer: COMMERCIAL

## 2019-04-08 NOTE — PROGRESS NOTES
Health  Wellness Visit Note    Name: Arely Haro  Clinic Number: [id number]  Physician: Magdalena Head, *  Diagnosis: No diagnosis found.  Past Medical History:   Diagnosis Date    Asthma     Hemochromatosis      Visit Number: 42  Precautions: Standard  6 Month: 2/15/2019  12 Month: 8/15/2019  Time In: 5:05PM  Time Out:5:40AM  Total Treatment Time: 35 minutes    Subjective:   Patient reports feeling well today. Just returned from [place], spent a few days out there, did not have any issued while flying. He was not able to play any tennis, hopes to play a few games this week if the weather holds up.  HEP compliant.     Objective:   Arely completed therapeutic stretches (bridge with march, PPT+bridge, TAD, side bridge, multifudus, quad bird dog, ham stretch) and the following MedX exercise machines: core lumbar, torso rotation l/r, leg extension, leg curl, upright row, chest press, biceps curl, triceps extension, leg press    See exercise log in patient folder for rate of exertion and repetitions completed.     Assessment:   Patient tolerated all Med-X machines without any increase in pain.  PT skipped stretching and icing. iced lower back after the completion of all exercises.   Plays tennis 3-6 times per week for 1.5 hours.      Plan:  Continue with established plan of care towards wellness goals. PT's end date will be 8/15/2019 because he had to wait a very long time between his Evaluation visit and first follow-up visit. Entered in [employer].    Health  : Anish Lundy  4/8/2019   Patient Refused

## 2019-04-15 ENCOUNTER — DOCUMENTATION ONLY (OUTPATIENT)
Dept: REHABILITATION | Facility: OTHER | Age: 49
End: 2019-04-15
Attending: PHYSICAL MEDICINE & REHABILITATION
Payer: COMMERCIAL

## 2019-04-15 NOTE — PROGRESS NOTES
Health  Wellness Visit Note    Name: Arely Haro  Clinic Number: [id number]  Physician: Magdalena Head, *  Diagnosis: No diagnosis found.  Past Medical History:   Diagnosis Date    Asthma     Hemochromatosis      Visit Number: 43  Precautions: Standard  6 Month: 2/15/2019  12 Month: 8/15/2019  Time In: 5:05PM  Time Out:5:45M  Total Treatment Time: 40 minutes    Subjective:   Patient reports feeling tired, played tennis during the weekend and before coming to his Wellness session. Will be playing tennis everyday this week, will take 2 weeks off during [employer]. No other symptoms to report HEP compliant.       Objective:   Arely completed therapeutic stretches (bridge with march, PPT+bridge, TAD, side bridge, multifudus, quad bird dog, ham stretch) and the following MedX exercise machines: core lumbar, torso rotation l/r, leg extension, leg curl, upright row, chest press, biceps curl, triceps extension, leg press    See exercise log in patient folder for rate of exertion and repetitions completed.     Assessment:   Patient tolerated all Med-X machines without any increase in pain.  PT skipped stretching and icing. iced lower back after the completion of all exercises.   Plays tennis 3-6 times per week for 1.5 hours.      Plan:  Continue with established plan of care towards wellness goals. PT's end date will be 8/15/2019 because he had to wait a very long time between his Evaluation visit and first follow-up visit. Entered in [employer].    Health  : Anish Lundy  4/15/2019

## 2019-05-09 ENCOUNTER — DOCUMENTATION ONLY (OUTPATIENT)
Dept: REHABILITATION | Facility: OTHER | Age: 49
End: 2019-05-09
Attending: PHYSICAL MEDICINE & REHABILITATION
Payer: COMMERCIAL

## 2019-05-09 NOTE — PROGRESS NOTES
Health  Wellness Visit Note    Name: Arely Haro  Clinic Number: [id number]  Physician: Magdalena Head, *  Diagnosis: No diagnosis found.  Past Medical History:   Diagnosis Date    Asthma     Hemochromatosis      Visit Number: 47  Precautions: Standard  6 Month: 2/15/2019  12 Month: 8/15/2019  Time In: 8:00AM  Time Out:8:50M  Total Treatment Time:50 minutes    Subjective:   Patient reports to wellness after being absent for 2 weeks, during that time [facility]. Back did not do as good as he planned but knew that he would have some soreness. Kept up with stretches during that time. Felt most machines where hard today. Will resume tennis tomorrow.         Objective:   Arely completed therapeutic stretches (bridge with march, PPT+bridge, TAD, side bridge, multifudus, quad bird dog, ham stretch) and the following MedX exercise machines: core lumbar, torso rotation l/r, leg extension, leg curl, upright row, chest press, biceps curl, triceps extension, leg press    See exercise log in patient folder for rate of exertion and repetitions completed.     Assessment:   Patient tolerated all Med-X machines without any increase in pain.  PT skipped stretching and icing. iced lower back after the completion of all exercises.   Plays tennis 3-6 times per week for 1.5 hours.      Plan:  Continue with established plan of care towards wellness goals. PT's end date will be 8/15/2019 because he had to wait a very long time between his Evaluation visit and first follow-up visit. Entered in [employer].    Health  : Anish Lundy  5/9/2019

## 2019-05-21 ENCOUNTER — DOCUMENTATION ONLY (OUTPATIENT)
Dept: REHABILITATION | Facility: OTHER | Age: 49
End: 2019-05-21
Attending: PHYSICAL MEDICINE & REHABILITATION
Payer: COMMERCIAL

## 2019-05-21 NOTE — PROGRESS NOTES
Health  Wellness Visit Note    Name: Arely Haro  Clinic Number: [id number]  Physician: Magdalena Head, *  Diagnosis: No diagnosis found.  Past Medical History:   Diagnosis Date    Asthma     Hemochromatosis      Visit Number: 48  Precautions: Standard  6 Month: 2/15/2019  12 Month: 8/15/2019  Time In: 5:00 PM  Time Out:5:40 PM  Total Treatment Time:50 minutes    Subjective:   Patient reports he is feeling well today; not having any pain or soreness He plans to take it easy over the holiday weekend. Kept up with stretches during that time. Felt most machines were much easier today compared to his last visit. Got back to playing tennis and says it went very well; played this morning.      Objective:   Arely completed therapeutic stretches (bridge with march, PPT+bridge, TAD, side bridge, multifudus, quad bird dog, ham stretch) and the following MedX exercise machines: core lumbar, torso rotation l/r, leg extension, leg curl, upright row, chest press, biceps curl, triceps extension, leg press    See exercise log in patient folder for rate of exertion and repetitions completed.     Assessment:   Patient tolerated all Med-X machines without any increase in pain.  PT skipped stretching and icing. iced lower back after the completion of all exercises.   Plays tennis 3-6 times per week for 1.5 hours.      Plan:  Continue with established plan of care towards wellness goals. PT's end date will be 8/15/2019 because he had to wait a very long time between his Evaluation visit and first follow-up visit. Entered in [facility].    Health  : Grabiel Garner, PCT  5/21/2019

## 2019-05-23 ENCOUNTER — DOCUMENTATION ONLY (OUTPATIENT)
Dept: REHABILITATION | Facility: OTHER | Age: 49
End: 2019-05-23
Attending: PHYSICAL MEDICINE & REHABILITATION
Payer: COMMERCIAL

## 2019-05-23 NOTE — PROGRESS NOTES
Health  Wellness Visit Note    Name: Arely Haro  Clinic Number: [id number]  Physician: Magdalena Head, *  Diagnosis: No diagnosis found.  Past Medical History:   Diagnosis Date    Asthma     Hemochromatosis      Visit Number: 49  Precautions: Standard  6 Month: 2/15/2019  12 Month: 8/15/2019  Time In: 7:35AM  Time Out:8:20M  Total Treatment Time:45minutes    Subjective:   Patient reports he is feeling well today; his back has been doing good. May reduce his tennis days, if he can not play later at night, due to it becoming hotter. Staying HEP compliant.      Objective:   Arely completed therapeutic stretches (bridge with march, PPT+bridge, TAD, side bridge, multifudus, quad bird dog, ham stretch) and the following MedX exercise machines: core lumbar, torso rotation l/r, leg extension, leg curl, upright row, chest press, biceps curl, triceps extension, leg press    See exercise log in patient folder for rate of exertion and repetitions completed.     Assessment:   Patient tolerated all Med-X machines without any increase in pain.  PT skipped stretching and icing. iced lower back after the completion of all exercises.   Plays tennis 3-6 times per week for 1.5 hours.      Plan:  Continue with established plan of care towards wellness goals. PT's end date will be 8/15/2019 because he had to wait a very long time between his Evaluation visit and first follow-up visit. Entered in [employer].    Health  : Anish Lundy  5/23/2019

## 2019-06-21 ENCOUNTER — DOCUMENTATION ONLY (OUTPATIENT)
Dept: REHABILITATION | Facility: OTHER | Age: 49
End: 2019-06-21
Attending: PHYSICAL MEDICINE & REHABILITATION
Payer: COMMERCIAL

## 2019-06-21 NOTE — PROGRESS NOTES
Health  Wellness Visit Note    Name: Arely Haro  Clinic Number: [id number]  Physician: Magdalena Head, *  Diagnosis: No diagnosis found.  Past Medical History:   Diagnosis Date    Asthma     Hemochromatosis      Visit Number: 50   Precautions: Standard  6 Month: 2/15/2019  12 Month: 8/15/2019  Time In: 12:35AM  Time Out:1:20M  Total Treatment Time:45minutes    Subjective:   Patient reports he is feeling well today; his back has been doing good. This is his first visit in a couple of weeks; he has been on vacation in [place]. He was able to maintain his weights and reps on all machines despite the break. May reduce his tennis days, if he can not play later at night, due to it becoming hotter. Staying HEP compliant.      Objective:   Arely completed therapeutic stretches (bridge with march, PPT+bridge, TAD, side bridge, multifudus, quad bird dog, ham stretch) and the following MedX exercise machines: core lumbar, torso rotation l/r, leg extension, leg curl, upright row, chest press, biceps curl, triceps extension, leg press    See exercise log in patient folder for rate of exertion and repetitions completed.     Assessment:   Patient tolerated all Med-X machines without any increase in pain.  PT skipped stretching and icing. iced lower back after the completion of all exercises.   Plays tennis 3-6 times per week for 1.5 hours.      Plan:  Continue with established plan of care towards wellness goals. PT's end date will be 8/15/2019 because he had to wait a very long time between his Evaluation visit and first follow-up visit. Entered in [employer].    Health  : Grabiel Garner, PCT  6/21/2019

## 2019-06-26 ENCOUNTER — DOCUMENTATION ONLY (OUTPATIENT)
Dept: REHABILITATION | Facility: OTHER | Age: 49
End: 2019-06-26
Attending: PHYSICAL MEDICINE & REHABILITATION
Payer: COMMERCIAL

## 2019-06-26 NOTE — PROGRESS NOTES
Health  Wellness Visit Note    Name: Arely Haro  Clinic Number: [id number]  Physician: Magdalena Head, *  Diagnosis: No diagnosis found.  Past Medical History:   Diagnosis Date    Asthma     Hemochromatosis      Visit Number: 51   Precautions: Standard  6 Month: 2/15/2019  12 Month: 8/15/2019  Time In: 5:05PM  Time Out:5:45PM  Total Treatment Time: 40minutes    Subjective:   Patient reports he is feeling well today; admits he has not been exercising as much as he should, and his back is a little sore. He will be traveling again next month. Has only played a few games of tennis this month due to time and heat. Keeping up with HEP.         Objective:   Arely completed therapeutic stretches (bridge with march, PPT+bridge, TAD, side bridge, multifudus, quad bird dog, ham stretch) and the following MedX exercise machines: core lumbar, torso rotation l/r, leg extension, leg curl, upright row, chest press, biceps curl, triceps extension, leg press    See exercise log in patient folder for rate of exertion and repetitions completed.     Assessment:   Patient tolerated all Med-X machines without any increase in pain.  PT skipped stretching and icing. iced lower back after the completion of all exercises.   Plays tennis 3-6 times per week for 1.5 hours.      Plan:  Continue with established plan of care towards wellness goals. PT's end date will be 8/15/2019 because he had to wait a very long time between his Evaluation visit and first follow-up visit.   Health  : Anish Lundy  6/26/2019

## 2019-08-05 ENCOUNTER — DOCUMENTATION ONLY (OUTPATIENT)
Dept: REHABILITATION | Facility: OTHER | Age: 49
End: 2019-08-05
Attending: PHYSICAL MEDICINE & REHABILITATION
Payer: COMMERCIAL

## 2019-08-05 NOTE — PROGRESS NOTES
Health  Wellness Visit Note    Name: Arely Haro  Clinic Number: [id number]  Physician: Magdalena Head, *  Diagnosis: No diagnosis found.  Past Medical History:   Diagnosis Date    Asthma     Hemochromatosis      Visit Number: 512  Precautions: Standard  6 Month: 2/15/2019  12 Month: 8/15/2019  Time In: 4:30PM  Time Out:5:05PM  Total Treatment Time: 35minutes    Subjective:   Patient reports to wellness after being absent for about a month, he was in [place]. Will there he did a lot of hiking. He did not do any strength training or play tennis. Hopes to get back into his tennis regimen now. While in [place] he was disappointed that he was getting tired quickly(due to elevation), especially his legs. He did not  Have any major back issues. Keeping up with HEP.       Objective:   Arely completed therapeutic stretches (bridge with march, PPT+bridge, TAD, side bridge, multifudus, quad bird dog, ham stretch) and the following MedX exercise machines: core lumbar, torso rotation l/r, leg extension, leg curl, upright row, chest press, biceps curl, triceps extension, leg press    See exercise log in patient folder for rate of exertion and repetitions completed.     Assessment:   Patient tolerated all Med-X machines without any increase in pain.  PT skipped stretching and icing. iced lower back after the completion of all exercises.   Plays tennis 3-6 times per week for 1.5 hours.      Plan:  Continue with established plan of care towards wellness goals. PT's end date will be 8/15/2019 because he had to wait a very long time between his Evaluation visit and first follow-up visit.   Health  : Anish Lundy  8/5/2019

## 2019-08-09 ENCOUNTER — DOCUMENTATION ONLY (OUTPATIENT)
Dept: REHABILITATION | Facility: OTHER | Age: 49
End: 2019-08-09
Attending: PHYSICAL MEDICINE & REHABILITATION
Payer: COMMERCIAL

## 2019-08-09 NOTE — PROGRESS NOTES
Health  Wellness Visit Note    Name: Arely Haro  Clinic Number: [id number]  Physician: Magdalena Head, *  Diagnosis: No diagnosis found.  Past Medical History:   Diagnosis Date    Asthma     Hemochromatosis      Visit Number: 53  Precautions: Standard  6 Month: 2/15/2019  12 Month: 8/15/2019  Time In: 8:00AM  Time Out:8:40M  Total Treatment Time: 40minutes    Subjective:   Patient reports some soreness in mid/lower back area. Thinks he worked to hard the last visit. He has a ice pack at home but does not ice, encouraged that he start doing it and explained the benefits of doing it. He will stop playing tennis the month of August because it is too hot. Discussed August being his last month, does not belong to a gym but will look into joining one, his insurance has a gym subscription. Keeping up with [facility].       Objective:   Arely completed therapeutic stretches (bridge with march, PPT+bridge, TAD, side bridge, multifudus, quad bird dog, ham stretch) and the following MedX exercise machines: core lumbar, torso rotation l/r, leg extension, leg curl, upright row, chest press, biceps curl, triceps extension, leg press    See exercise log in patient folder for rate of exertion and repetitions completed.     Assessment:   Patient tolerated all Med-X machines without any increase in pain.  PT skipped stretching and icing. iced lower back after the completion of all exercises.   Will skip playing tennis the month of august.   Plan:  Continue with established plan of care towards wellness goals. PT's end date will be 8/15/2019 because he had to wait a very long time between his Evaluation visit and first follow-up visit. August is last month.     Health  : Anish Lundy  8/9/2019

## 2019-08-12 ENCOUNTER — DOCUMENTATION ONLY (OUTPATIENT)
Dept: REHABILITATION | Facility: OTHER | Age: 49
End: 2019-08-12
Attending: PHYSICAL MEDICINE & REHABILITATION
Payer: COMMERCIAL

## 2019-08-12 NOTE — PROGRESS NOTES
Health  Wellness Visit Note    Name: Arely Haro  Clinic Number: [id number]  Physician: Magdalena Head, *  Diagnosis: No diagnosis found.  Past Medical History:   Diagnosis Date    Asthma     Hemochromatosis      Visit Number: 54  Precautions: Standard  6 Month: 2/15/2019  12 Month: 8/15/2019  Time In: 3:10PM  Time Out:4:00PM  Total Treatment Time: 50minutes    Subjective:   Patient reports feeling well today, not having to much soreness in lower back. Did not do any physical activities over the weekend, just relaxed. He will stop playing tennis the month of August because it is too hot. Discussed August being his last month, does not belong to a gym but will look into joining one, his insurance has a gym subscription. Keeping up with [facility].       Objective:   Arely completed therapeutic stretches (bridge with march, PPT+bridge, TAD, side bridge, multifudus, quad bird dog, ham stretch) and the following MedX exercise machines: core lumbar, torso rotation l/r, leg extension, leg curl, upright row, chest press, biceps curl, triceps extension, leg press    See exercise log in patient folder for rate of exertion and repetitions completed.     Assessment:   Patient tolerated all Med-X machines without any increase in pain.  PT skipped stretching and icing. iced lower back after the completion of all exercises.   Will skip playing tennis the month of august.   Plan:  Continue with established plan of care towards wellness goals. PT's end date will be 8/15/2019 because he had to wait a very long time between his Evaluation visit and first follow-up visit. August is last month.     Health  : Anish Lundy  8/12/2019

## 2019-08-16 ENCOUNTER — DOCUMENTATION ONLY (OUTPATIENT)
Dept: REHABILITATION | Facility: OTHER | Age: 49
End: 2019-08-16
Attending: PHYSICAL MEDICINE & REHABILITATION
Payer: COMMERCIAL

## 2019-08-16 NOTE — PROGRESS NOTES
Health  Wellness Visit Note    Name: Arely Haro  Clinic Number: [id number]  Physician: Magdalena Head, *  Diagnosis: No diagnosis found.  Past Medical History:   Diagnosis Date    Asthma     Hemochromatosis      Visit Number: 55  Precautions: Standard  6 Month: 2/15/2019  12 Month: 8/15/2019  Time In: 8:00AM  Time Out:8:40M  Total Treatment Time: 40minutes    Subjective:   Patient reports feeling well today, not having to much soreness in lower back. Did not do any physical activities over the weekend, just relaxed. He will stop playing tennis the month of August because it is too hot. Discussed August being his last month, does not belong to a gym but will look into joining one, his insurance has a gym subscription.  Has not yet looked into joining yet. Keeping up with [facility].       Objective:   Arely completed therapeutic stretches (bridge with march, PPT+bridge, TAD, side bridge, multifudus, quad bird dog, ham stretch) and the following MedX exercise machines: core lumbar, torso rotation l/r, leg extension, leg curl, upright row, chest press, biceps curl, triceps extension, leg press    See exercise log in patient folder for rate of exertion and repetitions completed.     Assessment:   Patient tolerated all Med-X machines without any increase in pain.  PT skipped stretching and icing. iced lower back after the completion of all exercises.   Will skip playing tennis the month of august.     Plan:  Continue with established plan of care towards wellness goals. PT's end date will be 8/15/2019 because he had to wait a very long time between his Evaluation visit and first follow-up visit. August is last month.     Health  : Anish Lundy  8/16/2019

## 2019-08-19 ENCOUNTER — DOCUMENTATION ONLY (OUTPATIENT)
Dept: REHABILITATION | Facility: OTHER | Age: 49
End: 2019-08-19
Attending: PHYSICAL MEDICINE & REHABILITATION
Payer: COMMERCIAL

## 2019-08-19 NOTE — PROGRESS NOTES
Health  Wellness Visit Note    Name: Arely Haro  Clinic Number: [id number]  Physician: Magdalena Head, *  Diagnosis: No diagnosis found.  Past Medical History:   Diagnosis Date    Asthma     Hemochromatosis      Visit Number: 56  Precautions: Standard  6 Month: 2/15/2019  12 Month: 8/15/2019  Time In: 5:05PM  Time Out:5:40PM  Total Treatment Time:35 minutes    Subjective:   Patient reports feeling well today, not having to much soreness in lower back. Did not do any physical activities over the weekend, just relaxed. He will stop playing tennis the month of August because it is too hot. He has looked into [employer] gym, will try that one and see if he likes it there.  Keeping up with [facility].       Objective:   Arely completed therapeutic stretches (bridge with march, PPT+bridge, TAD, side bridge, multifudus, quad bird dog, ham stretch) and the following MedX exercise machines: core lumbar, torso rotation l/r, leg extension, leg curl, upright row, chest press, biceps curl, triceps extension, leg press    See exercise log in patient folder for rate of exertion and repetitions completed.     Assessment:   Patient tolerated all Med-X machines without any increase in pain.  PT skipped stretching and icing. iced lower back after the completion of all exercises.   Will skip playing tennis the month of august.     Plan:  Continue with established plan of care towards wellness goals. PT's end date will be 8/15/2019 because he had to wait a very long time between his Evaluation visit and first follow-up visit. August is last month.     Health  : Anish Lundy  8/19/2019

## 2019-08-26 ENCOUNTER — DOCUMENTATION ONLY (OUTPATIENT)
Dept: REHABILITATION | Facility: OTHER | Age: 49
End: 2019-08-26
Attending: PHYSICAL MEDICINE & REHABILITATION
Payer: COMMERCIAL

## 2019-08-26 NOTE — PROGRESS NOTES
Health  Wellness Visit Note    Name: Arely Haro  Clinic Number: [id number]  Physician: Magdalena Head, *  Diagnosis: No diagnosis found.  Past Medical History:   Diagnosis Date    Asthma     Hemochromatosis      Visit Number: 57  Precautions: Standard  6 Month: 2/15/2019  12 Month: 8/15/2019  Time In: 8:40AM  Time Out: 9:20M  Total Treatment Time:40 minutes    Subjective:   Patient reports feeling well today, not having to much soreness in lower back. Did not do any physical activities over the weekend, just relaxed. He has stop playing tennis the month of August because it is too hot. He has looked into [employer] gym, will try that one and see if he likes it there.  Keeping up with [facility].       Objective:   Arely completed therapeutic stretches (bridge with march, PPT+bridge, TAD, side bridge, multifudus, quad bird dog, ham stretch) and the following MedX exercise machines: core lumbar, torso rotation l/r, leg extension, leg curl, upright row, chest press, biceps curl, triceps extension, leg press    See exercise log in patient folder for rate of exertion and repetitions completed.     Assessment:   Patient tolerated all Med-X machines without any increase in pain.  PT skipped stretching and icing. iced lower back after the completion of all exercises.   Will skip playing tennis the month of august.     Plan:  Continue with established plan of care towards wellness goals. PT's end date will be 8/15/2019 because he had to wait a very long time between his Evaluation visit and first follow-up visit. August is last month.     Health  : Anish Lundy  8/26/2019

## 2019-08-30 ENCOUNTER — DOCUMENTATION ONLY (OUTPATIENT)
Dept: REHABILITATION | Facility: OTHER | Age: 49
End: 2019-08-30
Attending: PHYSICAL MEDICINE & REHABILITATION
Payer: COMMERCIAL

## 2019-08-30 NOTE — PROGRESS NOTES
Health  Wellness Visit Note    Name: Arely Haro  Clinic Number: [id number]  Physician: Magdalena Head, *  Diagnosis: No diagnosis found.  Past Medical History:   Diagnosis Date    Asthma     Hemochromatosis      Visit Number: 58  Precautions: Standard  6 Month: 2/15/2019  12 Month: 8/15/2019  Time In: 8:00AM  Time Out: 8:40M  Total Treatment Time:40 minutes    Subjective:   Patient reports feeling well today, not having to much soreness in lower back. Did not do any physical activities over the weekend, just relaxed. He has stop playing tennis the month of August because it is too hot. Has decided to continue at [employer] or [employer]. Keeping up with [facility].       Objective:   Arely completed therapeutic stretches (bridge with march, PPT+bridge, TAD, side bridge, multifudus, quad bird dog, ham stretch) and the following MedX exercise machines: core lumbar, torso rotation l/r, leg extension, leg curl, upright row, chest press, biceps curl, triceps extension, leg press    See exercise log in patient folder for rate of exertion and repetitions completed.     Assessment:   Patient tolerated all Med-X machines without any increase in pain.  PT skipped stretching and icing. iced lower back after the completion of all exercises.   Will skip playing tennis the month of august.     Plan:  Will continue under gy  Health  : Anish Lundy  8/30/2019

## 2022-07-01 DIAGNOSIS — R00.2 PALPITATIONS: Primary | ICD-10-CM

## 2022-07-24 PROBLEM — E78.5 DYSLIPIDEMIA: Status: ACTIVE | Noted: 2022-07-24

## 2022-07-24 NOTE — PROGRESS NOTES
Subjective:   Patient ID:  Arely Haro is a 52 y.o. male who presents for evaluationof Cholesterol issues    HPI: The patient is here for dyslipidemia.     The patient has no chest pain, SOB, TIA, palpitations, syncope or pre-syncope.Patient does not exercise a lot but modest and more when cooler.Smokes week a few per week and has 3-4 drinks several times per week. BP creeping up . Dad has FH in his 50s.. CAC 0 in 16 and 17        Review of Systems   Constitutional: Negative for chills, decreased appetite, diaphoresis, fever, malaise/fatigue, night sweats, weight gain and weight loss.   HENT: Negative for congestion, hoarse voice, nosebleeds, sore throat and tinnitus.    Eyes: Negative for blurred vision, double vision, vision loss in left eye, vision loss in right eye, visual disturbance and visual halos.   Cardiovascular: Negative for chest pain, claudication, cyanosis, dyspnea on exertion, irregular heartbeat, leg swelling, near-syncope, orthopnea, palpitations, paroxysmal nocturnal dyspnea and syncope.   Respiratory: Negative for cough, hemoptysis, shortness of breath, sleep disturbances due to breathing, snoring, sputum production and wheezing.    Endocrine: Negative for cold intolerance, heat intolerance, polydipsia, polyphagia and polyuria.   Hematologic/Lymphatic: Negative for adenopathy and bleeding problem. Does not bruise/bleed easily.   Skin: Negative for color change, dry skin, flushing, itching, nail changes, poor wound healing, rash, skin cancer, suspicious lesions and unusual hair distribution.   Musculoskeletal: Negative for arthritis, back pain, falls, gout, joint pain, joint swelling, muscle cramps, muscle weakness, myalgias and stiffness.   Gastrointestinal: Negative for abdominal pain, anorexia, change in bowel habit, constipation, diarrhea, dysphagia, heartburn, hematemesis, hematochezia, melena and vomiting.   Genitourinary: Negative for decreased libido, dysuria, hematuria, hesitancy and  "urgency.   Neurological: Negative for excessive daytime sleepiness, dizziness, focal weakness, headaches, light-headedness, loss of balance, numbness, paresthesias, seizures, sensory change, tremors, vertigo and weakness.   Psychiatric/Behavioral: Negative for altered mental status, depression, hallucinations, memory loss, substance abuse and suicidal ideas. The patient does not have insomnia and is not nervous/anxious.    Allergic/Immunologic: Negative for environmental allergies and hives.       Objective: /89 (BP Location: Left arm, Patient Position: Sitting, BP Method: Medium (Automatic))   Pulse 70   Ht 5' 9" (1.753 m)   Wt 77.9 kg (171 lb 11.8 oz)   BMI 25.36 kg/m²      Physical Exam  Constitutional:       General: He is not in acute distress.     Appearance: He is well-developed. He is not diaphoretic.   HENT:      Head: Normocephalic.   Eyes:      Pupils: Pupils are equal, round, and reactive to light.   Neck:      Thyroid: No thyromegaly.   Cardiovascular:      Rate and Rhythm: Normal rate and regular rhythm.      Pulses: Intact distal pulses.           Carotid pulses are 3+ on the right side and 3+ on the left side.       Radial pulses are 3+ on the right side and 3+ on the left side.        Femoral pulses are 3+ on the right side and 3+ on the left side.       Popliteal pulses are 3+ on the right side and 3+ on the left side.        Dorsalis pedis pulses are 3+ on the right side and 3+ on the left side.        Posterior tibial pulses are 3+ on the right side and 3+ on the left side.      Heart sounds: Normal heart sounds. No murmur heard.    No friction rub. No gallop.   Pulmonary:      Effort: Pulmonary effort is normal. No respiratory distress.      Breath sounds: Normal breath sounds. No wheezing or rales.   Chest:      Chest wall: No tenderness.   Abdominal:      General: There is no distension.      Palpations: Abdomen is soft. There is no mass.      Tenderness: There is no abdominal " tenderness.   Musculoskeletal:         General: Normal range of motion.      Cervical back: Normal range of motion.   Lymphadenopathy:      Cervical: No cervical adenopathy.   Skin:     General: Skin is warm.      Nails: There is no clubbing.   Neurological:      Mental Status: He is alert and oriented to person, place, and time.   Psychiatric:         Speech: Speech normal.         Behavior: Behavior normal.         Thought Content: Thought content normal.         Judgment: Judgment normal.       , tg 63,hdl 52; ldl 167  Assessment:     1. Dyslipidemia    2. Elevated blood-pressure reading without diagnosis of hypertension    3. Family history of early CAD    4. Encounter for screening for cardiovascular disorders        Plan:   Discussed diet , achieving and maintaining ideal body weight, and exercise.   We reviewed meds in detail.  Reassured-Discussed goals, options, plan.  Omega-3 > 800 mg/d combined EPA/DHA.  Goal BP< 130/80.  Goal LDL < 100.  Could do CAC test  Increase Prava to 40 -4 10s or one 40 and better at nite  If CAC still 0 , could repeat closer to 60  Home BPs   Later , could even increase Prava to 80 or use more potent statins    Arely was seen today for establish care and dyslipidemia.    Diagnoses and all orders for this visit:    Dyslipidemia    Elevated blood-pressure reading without diagnosis of hypertension    Family history of early CAD    Encounter for screening for cardiovascular disorders    Other orders  -     Discontinue: pravastatin (PRAVACHOL) 10 MG tablet; Take 10 mg by mouth once daily.  -     acetylcysteine (NAC) 600 mg Cap; Take 600 mg by mouth 3 (three) times daily.  -     omega-3 fatty acids/fish oil (FISH OIL-OMEGA-3 FATTY ACIDS) 300-1,000 mg capsule; Take 1 capsule by mouth once daily. 5000 units            Follow up for CAC now ; labs 3 months.

## 2022-07-25 ENCOUNTER — HOSPITAL ENCOUNTER (OUTPATIENT)
Dept: CARDIOLOGY | Facility: CLINIC | Age: 52
Discharge: HOME | End: 2022-07-25
Payer: COMMERCIAL

## 2022-07-25 ENCOUNTER — OFFICE VISIT (OUTPATIENT)
Dept: CARDIOLOGY | Facility: CLINIC | Age: 52
End: 2022-07-25
Payer: COMMERCIAL

## 2022-07-25 VITALS
BODY MASS INDEX: 25.44 KG/M2 | HEIGHT: 69 IN | HEART RATE: 70 BPM | SYSTOLIC BLOOD PRESSURE: 125 MMHG | WEIGHT: 171.75 LBS | DIASTOLIC BLOOD PRESSURE: 89 MMHG

## 2022-07-25 DIAGNOSIS — Z82.49 FAMILY HISTORY OF EARLY CAD: ICD-10-CM

## 2022-07-25 DIAGNOSIS — R00.2 PALPITATIONS: ICD-10-CM

## 2022-07-25 DIAGNOSIS — Z13.6 ENCOUNTER FOR SCREENING FOR CARDIOVASCULAR DISORDERS: ICD-10-CM

## 2022-07-25 DIAGNOSIS — R03.0 ELEVATED BLOOD-PRESSURE READING WITHOUT DIAGNOSIS OF HYPERTENSION: ICD-10-CM

## 2022-07-25 DIAGNOSIS — E78.5 DYSLIPIDEMIA: ICD-10-CM

## 2022-07-25 PROCEDURE — 93005 ELECTROCARDIOGRAM TRACING: CPT | Mod: S$GLB,,, | Performed by: INTERNAL MEDICINE

## 2022-07-25 PROCEDURE — 99204 PR OFFICE/OUTPT VISIT, NEW, LEVL IV, 45-59 MIN: ICD-10-PCS | Mod: S$GLB,,, | Performed by: INTERNAL MEDICINE

## 2022-07-25 PROCEDURE — 3008F PR BODY MASS INDEX (BMI) DOCUMENTED: ICD-10-PCS | Mod: CPTII,S$GLB,, | Performed by: INTERNAL MEDICINE

## 2022-07-25 PROCEDURE — 99204 OFFICE O/P NEW MOD 45 MIN: CPT | Mod: S$GLB,,, | Performed by: INTERNAL MEDICINE

## 2022-07-25 PROCEDURE — 3074F SYST BP LT 130 MM HG: CPT | Mod: CPTII,S$GLB,, | Performed by: INTERNAL MEDICINE

## 2022-07-25 PROCEDURE — 1159F PR MEDICATION LIST DOCUMENTED IN MEDICAL RECORD: ICD-10-PCS | Mod: CPTII,S$GLB,, | Performed by: INTERNAL MEDICINE

## 2022-07-25 PROCEDURE — 93010 ELECTROCARDIOGRAM REPORT: CPT | Mod: S$GLB,,, | Performed by: INTERNAL MEDICINE

## 2022-07-25 PROCEDURE — 1159F MED LIST DOCD IN RCRD: CPT | Mod: CPTII,S$GLB,, | Performed by: INTERNAL MEDICINE

## 2022-07-25 PROCEDURE — 99999 PR PBB SHADOW E&M-EST. PATIENT-LVL IV: CPT | Mod: PBBFAC,,, | Performed by: INTERNAL MEDICINE

## 2022-07-25 PROCEDURE — 93010 EKG 12-LEAD: ICD-10-PCS | Mod: S$GLB,,, | Performed by: INTERNAL MEDICINE

## 2022-07-25 PROCEDURE — 3079F DIAST BP 80-89 MM HG: CPT | Mod: CPTII,S$GLB,, | Performed by: INTERNAL MEDICINE

## 2022-07-25 PROCEDURE — 3008F BODY MASS INDEX DOCD: CPT | Mod: CPTII,S$GLB,, | Performed by: INTERNAL MEDICINE

## 2022-07-25 PROCEDURE — 3079F PR MOST RECENT DIASTOLIC BLOOD PRESSURE 80-89 MM HG: ICD-10-PCS | Mod: CPTII,S$GLB,, | Performed by: INTERNAL MEDICINE

## 2022-07-25 PROCEDURE — 99999 PR PBB SHADOW E&M-EST. PATIENT-LVL IV: ICD-10-PCS | Mod: PBBFAC,,, | Performed by: INTERNAL MEDICINE

## 2022-07-25 PROCEDURE — 3074F PR MOST RECENT SYSTOLIC BLOOD PRESSURE < 130 MM HG: ICD-10-PCS | Mod: CPTII,S$GLB,, | Performed by: INTERNAL MEDICINE

## 2022-07-25 PROCEDURE — 93005 EKG 12-LEAD: ICD-10-PCS | Mod: S$GLB,,, | Performed by: INTERNAL MEDICINE

## 2022-07-25 RX ORDER — PRAVASTATIN SODIUM 40 MG/1
40 TABLET ORAL DAILY
Qty: 90 TABLET | Refills: 3 | Status: SHIPPED | OUTPATIENT
Start: 2022-07-25 | End: 2023-05-11

## 2022-07-25 RX ORDER — PRAVASTATIN SODIUM 10 MG/1
10 TABLET ORAL DAILY
COMMUNITY
Start: 2022-06-18 | End: 2022-07-25

## 2022-07-25 RX ORDER — AMOXICILLIN 500 MG
1 CAPSULE ORAL DAILY
COMMUNITY

## 2022-07-25 RX ORDER — ACETYLCYSTEINE 600 MG
600 CAPSULE ORAL 3 TIMES DAILY
COMMUNITY

## 2022-07-25 NOTE — PATIENT INSTRUCTIONS
Discussed diet , achieving and maintaining ideal body weight, and exercise.   We reviewed meds in detail.  Reassured-Discussed goals, options, plan.  Omega-3 > 800 mg/d combined EPA/DHA.  Goal BP< 130/80.  Goal LDL < 100.  Could do CAC test  Increase Prava to 40 -4 10s or one 40 and better at nite  If CAC still 0 , could repeat closer to 60  Home BPs   Later , could even increase Prava to 80 or use more potent statins

## 2022-08-04 ENCOUNTER — HOSPITAL ENCOUNTER (OUTPATIENT)
Dept: RADIOLOGY | Facility: HOSPITAL | Age: 52
Discharge: HOME | End: 2022-08-04
Attending: INTERNAL MEDICINE
Payer: COMMERCIAL

## 2022-08-04 DIAGNOSIS — R03.0 ELEVATED BLOOD-PRESSURE READING WITHOUT DIAGNOSIS OF HYPERTENSION: ICD-10-CM

## 2022-08-04 DIAGNOSIS — Z82.49 FAMILY HISTORY OF EARLY CAD: ICD-10-CM

## 2022-08-04 DIAGNOSIS — Z13.6 ENCOUNTER FOR SCREENING FOR CARDIOVASCULAR DISORDERS: ICD-10-CM

## 2022-08-04 DIAGNOSIS — E78.5 DYSLIPIDEMIA: ICD-10-CM

## 2022-08-04 PROCEDURE — 75571 CT HRT W/O DYE W/CA TEST: CPT | Mod: TC

## 2022-08-04 PROCEDURE — 75571 CT CALCIUM SCORING CARDIAC: ICD-10-PCS | Mod: 26,,, | Performed by: RADIOLOGY

## 2022-08-04 PROCEDURE — 75571 CT HRT W/O DYE W/CA TEST: CPT | Mod: 26,,, | Performed by: RADIOLOGY

## 2022-10-27 ENCOUNTER — LAB VISIT (OUTPATIENT)
Dept: LAB | Facility: HOSPITAL | Age: 52
End: 2022-10-27
Attending: INTERNAL MEDICINE
Payer: COMMERCIAL

## 2022-10-27 ENCOUNTER — PATIENT MESSAGE (OUTPATIENT)
Dept: CARDIOLOGY | Facility: CLINIC | Age: 52
End: 2022-10-27
Payer: COMMERCIAL

## 2022-10-27 DIAGNOSIS — Z82.49 FAMILY HISTORY OF EARLY CAD: ICD-10-CM

## 2022-10-27 DIAGNOSIS — E78.5 DYSLIPIDEMIA: Primary | ICD-10-CM

## 2022-10-27 DIAGNOSIS — E78.5 DYSLIPIDEMIA: ICD-10-CM

## 2022-10-27 LAB
ALBUMIN SERPL BCP-MCNC: 3.8 G/DL (ref 3.5–5.2)
ALP SERPL-CCNC: 73 U/L (ref 55–135)
ALT SERPL W/O P-5'-P-CCNC: 24 U/L (ref 10–44)
ANION GAP SERPL CALC-SCNC: 9 MMOL/L (ref 8–16)
AST SERPL-CCNC: 23 U/L (ref 10–40)
BILIRUB SERPL-MCNC: 0.8 MG/DL (ref 0.1–1)
BUN SERPL-MCNC: 10 MG/DL (ref 6–20)
CALCIUM SERPL-MCNC: 9.3 MG/DL (ref 8.7–10.5)
CHLORIDE SERPL-SCNC: 105 MMOL/L (ref 95–110)
CHOLEST SERPL-MCNC: 172 MG/DL (ref 120–199)
CHOLEST/HDLC SERPL: 2.8 {RATIO} (ref 2–5)
CO2 SERPL-SCNC: 24 MMOL/L (ref 23–29)
CREAT SERPL-MCNC: 0.8 MG/DL (ref 0.5–1.4)
EST. GFR  (NO RACE VARIABLE): >60 ML/MIN/1.73 M^2
GLUCOSE SERPL-MCNC: 94 MG/DL (ref 70–110)
HDLC SERPL-MCNC: 62 MG/DL (ref 40–75)
HDLC SERPL: 36 % (ref 20–50)
LDLC SERPL CALC-MCNC: 94.4 MG/DL (ref 63–159)
NONHDLC SERPL-MCNC: 110 MG/DL
POTASSIUM SERPL-SCNC: 4.1 MMOL/L (ref 3.5–5.1)
PROT SERPL-MCNC: 6.8 G/DL (ref 6–8.4)
SODIUM SERPL-SCNC: 138 MMOL/L (ref 136–145)
TRIGL SERPL-MCNC: 78 MG/DL (ref 30–150)

## 2022-10-27 PROCEDURE — 80061 LIPID PANEL: CPT | Performed by: INTERNAL MEDICINE

## 2022-10-27 PROCEDURE — 36415 COLL VENOUS BLD VENIPUNCTURE: CPT | Performed by: INTERNAL MEDICINE

## 2022-10-27 PROCEDURE — 80053 COMPREHEN METABOLIC PANEL: CPT | Performed by: INTERNAL MEDICINE

## 2023-05-11 DIAGNOSIS — E78.5 DYSLIPIDEMIA: ICD-10-CM

## 2023-05-11 DIAGNOSIS — Z82.49 FAMILY HISTORY OF EARLY CAD: ICD-10-CM

## 2023-05-11 RX ORDER — PRAVASTATIN SODIUM 40 MG/1
TABLET ORAL
Qty: 90 TABLET | Refills: 3 | Status: SHIPPED | OUTPATIENT
Start: 2023-05-11 | End: 2024-02-28 | Stop reason: ALTCHOICE

## 2023-12-05 ENCOUNTER — HOSPITAL ENCOUNTER (EMERGENCY)
Facility: OTHER | Age: 53
Discharge: HOME | End: 2023-12-06
Attending: INTERNAL MEDICINE
Payer: COMMERCIAL

## 2023-12-05 DIAGNOSIS — R55 SYNCOPE: ICD-10-CM

## 2023-12-05 LAB
FIO2: 21
HCT VFR BLD CALC: 38 %PCV (ref 36–54)
HGB BLD-MCNC: 13 G/DL
POC IONIZED CALCIUM: 1.18 MMOL/L (ref 1.06–1.42)
POCT GLUCOSE: 103 MG/DL (ref 70–110)
POTASSIUM BLD-SCNC: 3.7 MMOL/L (ref 3.5–5.1)
SAMPLE: NORMAL
SODIUM BLD-SCNC: 138 MMOL/L (ref 136–145)

## 2023-12-05 PROCEDURE — 93010 EKG 12-LEAD: ICD-10-PCS | Mod: ,,, | Performed by: INTERNAL MEDICINE

## 2023-12-05 PROCEDURE — 84295 ASSAY OF SERUM SODIUM: CPT

## 2023-12-05 PROCEDURE — 25000003 PHARM REV CODE 250: Performed by: INTERNAL MEDICINE

## 2023-12-05 PROCEDURE — 99284 EMERGENCY DEPT VISIT MOD MDM: CPT | Mod: 25

## 2023-12-05 PROCEDURE — 82800 BLOOD PH: CPT

## 2023-12-05 PROCEDURE — 85018 HEMOGLOBIN: CPT

## 2023-12-05 PROCEDURE — 93010 ELECTROCARDIOGRAM REPORT: CPT | Mod: ,,, | Performed by: INTERNAL MEDICINE

## 2023-12-05 PROCEDURE — 82330 ASSAY OF CALCIUM: CPT

## 2023-12-05 PROCEDURE — 82803 BLOOD GASES ANY COMBINATION: CPT

## 2023-12-05 PROCEDURE — 99900035 HC TECH TIME PER 15 MIN (STAT)

## 2023-12-05 PROCEDURE — 82962 GLUCOSE BLOOD TEST: CPT

## 2023-12-05 PROCEDURE — 93005 ELECTROCARDIOGRAM TRACING: CPT

## 2023-12-05 PROCEDURE — 96360 HYDRATION IV INFUSION INIT: CPT

## 2023-12-05 PROCEDURE — 85014 HEMATOCRIT: CPT

## 2023-12-05 RX ADMIN — SODIUM CHLORIDE 1000 ML: 9 INJECTION, SOLUTION INTRAVENOUS at 11:12

## 2023-12-06 VITALS
DIASTOLIC BLOOD PRESSURE: 59 MMHG | OXYGEN SATURATION: 99 % | RESPIRATION RATE: 21 BRPM | HEIGHT: 69 IN | HEART RATE: 62 BPM | WEIGHT: 165 LBS | TEMPERATURE: 97 F | SYSTOLIC BLOOD PRESSURE: 104 MMHG | BODY MASS INDEX: 24.44 KG/M2

## 2023-12-06 LAB
ALBUMIN SERPL BCP-MCNC: 3.9 G/DL (ref 3.5–5.2)
ALP SERPL-CCNC: 81 U/L (ref 55–135)
ALT SERPL W/O P-5'-P-CCNC: 23 U/L (ref 10–44)
ANION GAP SERPL CALC-SCNC: 11 MMOL/L (ref 8–16)
AST SERPL-CCNC: 26 U/L (ref 10–40)
BASOPHILS # BLD AUTO: 0.05 K/UL (ref 0–0.2)
BASOPHILS NFR BLD: 0.6 % (ref 0–1.9)
BILIRUB SERPL-MCNC: 0.7 MG/DL (ref 0.1–1)
BILIRUB UR QL STRIP: NEGATIVE
BUN SERPL-MCNC: 16 MG/DL (ref 6–20)
CALCIUM SERPL-MCNC: 9.4 MG/DL (ref 8.7–10.5)
CHLORIDE SERPL-SCNC: 103 MMOL/L (ref 95–110)
CLARITY UR: CLEAR
CO2 SERPL-SCNC: 24 MMOL/L (ref 23–29)
COLOR UR: YELLOW
CREAT SERPL-MCNC: 1.2 MG/DL (ref 0.5–1.4)
DIFFERENTIAL METHOD: ABNORMAL
EOSINOPHIL # BLD AUTO: 0.1 K/UL (ref 0–0.5)
EOSINOPHIL NFR BLD: 1.7 % (ref 0–8)
ERYTHROCYTE [DISTWIDTH] IN BLOOD BY AUTOMATED COUNT: 12 % (ref 11.5–14.5)
EST. GFR  (NO RACE VARIABLE): >60 ML/MIN/1.73 M^2
GLUCOSE SERPL-MCNC: 117 MG/DL (ref 70–110)
GLUCOSE UR QL STRIP: NEGATIVE
HCT VFR BLD AUTO: 40 % (ref 40–54)
HCV AB SERPL QL IA: NEGATIVE
HGB BLD-MCNC: 13.8 G/DL (ref 14–18)
HGB UR QL STRIP: NEGATIVE
HIV 1+2 AB+HIV1 P24 AG SERPL QL IA: NEGATIVE
IMM GRANULOCYTES # BLD AUTO: 0.03 K/UL (ref 0–0.04)
IMM GRANULOCYTES NFR BLD AUTO: 0.4 % (ref 0–0.5)
KETONES UR QL STRIP: NEGATIVE
LEUKOCYTE ESTERASE UR QL STRIP: NEGATIVE
LYMPHOCYTES # BLD AUTO: 2.6 K/UL (ref 1–4.8)
LYMPHOCYTES NFR BLD: 30.8 % (ref 18–48)
MCH RBC QN AUTO: 31.5 PG (ref 27–31)
MCHC RBC AUTO-ENTMCNC: 34.5 G/DL (ref 32–36)
MCV RBC AUTO: 91 FL (ref 82–98)
MONOCYTES # BLD AUTO: 0.6 K/UL (ref 0.3–1)
MONOCYTES NFR BLD: 7 % (ref 4–15)
NEUTROPHILS # BLD AUTO: 4.9 K/UL (ref 1.8–7.7)
NEUTROPHILS NFR BLD: 59.5 % (ref 38–73)
NITRITE UR QL STRIP: NEGATIVE
NRBC BLD-RTO: 0 /100 WBC
PH UR STRIP: 6 [PH] (ref 5–8)
PLATELET # BLD AUTO: 197 K/UL (ref 150–450)
PMV BLD AUTO: 10.5 FL (ref 9.2–12.9)
POTASSIUM SERPL-SCNC: 3.7 MMOL/L (ref 3.5–5.1)
PROT SERPL-MCNC: 7.5 G/DL (ref 6–8.4)
PROT UR QL STRIP: ABNORMAL
RBC # BLD AUTO: 4.38 M/UL (ref 4.6–6.2)
SODIUM SERPL-SCNC: 138 MMOL/L (ref 136–145)
SP GR UR STRIP: 1.02 (ref 1–1.03)
T4 FREE SERPL-MCNC: 0.93 NG/DL (ref 0.71–1.51)
TSH SERPL DL<=0.005 MIU/L-ACNC: 8.36 UIU/ML (ref 0.4–4)
URN SPEC COLLECT METH UR: ABNORMAL
UROBILINOGEN UR STRIP-ACNC: NEGATIVE EU/DL
WBC # BLD AUTO: 8.28 K/UL (ref 3.9–12.7)

## 2023-12-06 PROCEDURE — 85025 COMPLETE CBC W/AUTO DIFF WBC: CPT | Performed by: INTERNAL MEDICINE

## 2023-12-06 PROCEDURE — 80053 COMPREHEN METABOLIC PANEL: CPT | Performed by: INTERNAL MEDICINE

## 2023-12-06 PROCEDURE — 96361 HYDRATE IV INFUSION ADD-ON: CPT

## 2023-12-06 PROCEDURE — 84443 ASSAY THYROID STIM HORMONE: CPT | Performed by: INTERNAL MEDICINE

## 2023-12-06 PROCEDURE — 87389 HIV-1 AG W/HIV-1&-2 AB AG IA: CPT | Performed by: INTERNAL MEDICINE

## 2023-12-06 PROCEDURE — 25000003 PHARM REV CODE 250: Performed by: INTERNAL MEDICINE

## 2023-12-06 PROCEDURE — 84439 ASSAY OF FREE THYROXINE: CPT | Performed by: INTERNAL MEDICINE

## 2023-12-06 PROCEDURE — 81003 URINALYSIS AUTO W/O SCOPE: CPT | Performed by: INTERNAL MEDICINE

## 2023-12-06 PROCEDURE — 86803 HEPATITIS C AB TEST: CPT | Performed by: INTERNAL MEDICINE

## 2023-12-06 RX ADMIN — SODIUM CHLORIDE 1000 ML: 9 INJECTION, SOLUTION INTRAVENOUS at 12:12

## 2023-12-06 NOTE — DISCHARGE INSTRUCTIONS
Diagnosis:   1. Syncope        Tests you had showed:   Labs Reviewed   CBC W/ AUTO DIFFERENTIAL - Abnormal; Notable for the following components:       Result Value    RBC 4.38 (*)     Hemoglobin 13.8 (*)     MCH 31.5 (*)     All other components within normal limits   COMPREHENSIVE METABOLIC PANEL - Abnormal; Notable for the following components:    Glucose 117 (*)     All other components within normal limits   URINALYSIS, REFLEX TO URINE CULTURE - Abnormal; Notable for the following components:    Protein, UA Trace (*)     All other components within normal limits    Narrative:     Specimen Source->Urine   TSH - Abnormal; Notable for the following components:    TSH 8.361 (*)     All other components within normal limits   HIV 1 / 2 ANTIBODY    Narrative:     Release to patient->Immediate   HEPATITIS C ANTIBODY    Narrative:     Release to patient->Immediate   T4, FREE   POCT GLUCOSE   ISTAT PROCEDURE   POCT GLUCOSE MONITORING CONTINUOUS      No orders to display       Treatments you received were:   Medications   sodium chloride 0.9% bolus 1,000 mL 1,000 mL (0 mLs Intravenous Stopped 12/6/23 0006)   sodium chloride 0.9% bolus 1,000 mL 1,000 mL (1,000 mLs Intravenous New Bag 12/6/23 0006)       Home Care Instructions:  - Medications: Continue taking your home medications as prescribed    Follow-Up Plan:  - Follow-up with: Primary care doctor within 1-2  days  - Additional testing and/or evaluation will be directed by your primary doctor    Return to the Emergency Department for symptoms including but not limited to: worsening symptoms, severe back pain, shortness of breath or chest pain, vomiting with inability to hold down fluids, blood in vomit or poop, fevers greater than 100.4°F, passing out/fainting/unconsciousness, or other concerning symptoms.     No future appointments.

## 2023-12-06 NOTE — ED PROVIDER NOTES
"Encounter date: 11:02 PM 12/06/2023    Source of History   Patient/wife    Chief Complaint   Pt presents with:   Loss of Consciousness (Reports syncopal episodes this evening, approx 40 mins PTA, patient was standing and caught by friends, while sitting patient had "eyes roll back in head", (+) LOC for several minutes. Currently, flush colored, clear speech. Did smoke marijuana and had 1 beer prior to incident, underwent dental work this am)      History Of Present Illness   Arely Haro is a 53 y.o. male with hyperlipidemia who presents to the ED with chief complaint of syncope 40 minutes prior to arrival.  Patient was in normal status of health.  He was at a [employer].  He states that he had a beer smoked marijuana.  He denies any other drug use.  He does note that he had a recent dental procedure which they use Novocain and earlier that day.  While at the concert this patient started to feel warm and felt lightheaded.  He states that he felt like he was going to pass out.  He walked outside of the theater and was caught by other concert guest.  He was assisted to the ground.  No head trauma, he was assisted to the ground.  He was then sat on a bench.  He was evaluated by a pediatric doctor, and ENT and an emergency nurse who noted that his heart rate was slightly low.  Wife stated that he seemed disoriented for approximately 20 minutes.  Patient states when he was in the car ride here he started to feel much better.  He denies any chest pain, shortness of breath, nausea, vomiting, diarrhea, headache, recent sick contacts or ongoing symptoms.  No seizure-like activity or seizure history.  No tongue biting loss of bowel or bladder tone.  Denies anticoagulation use.    This is the extent to the patients complaints today here in the emergency department.  Past History   Review of patient's allergies indicates:  No Known Allergies    No current facility-administered medications on file prior to encounter. "     Current Outpatient Medications on File Prior to Encounter   Medication Sig Dispense Refill    acetylcysteine (NAC) 600 mg Cap Take 600 mg by mouth 3 (three) times daily.      multivitamin (THERAGRAN) tablet Take 1 tablet by mouth once daily.      omega-3 fatty acids/fish oil (FISH OIL-OMEGA-3 FATTY ACIDS) 300-1,000 mg capsule Take 1 capsule by mouth once daily. 5000 units      pravastatin (PRAVACHOL) 40 MG tablet TAKE 1 TABLET(40 MG) BY MOUTH EVERY DAY 90 tablet 3    vitamin D 1000 units Tab Take 1,000 Units by mouth once daily.         As per HPI and below:  Past Medical History:   Diagnosis Date    Asthma     Hemochromatosis      No past surgical history on file.    Social History     Socioeconomic History    Marital status:    Tobacco Use    Smoking status: Never    Smokeless tobacco: Never   Substance and Sexual Activity    Alcohol use: Yes     Comment: socail    Drug use: Yes     Types: Marijuana       Family History   Problem Relation Age of Onset    Arthritis Mother     Skin cancer Mother     Cancer Father     Heart disease Father     Hypertension Father     Mitral valve prolapse Father        Physical Exam     Vitals:    12/05/23 2358 12/05/23 2359 12/06/23 0102 12/06/23 0103   BP:  (!) 89/59 (!) 104/59    BP Location:   Left arm    Patient Position:   Sitting    Pulse: 62 60 62    Resp: 18 19 (!) 33 (!) 21   Temp:       TempSrc:       SpO2: 100% 100% 99%    Weight:       Height:         Physical Exam:   Nursing note and vitals reviewed.  Appearance:  Well-appearing, nontoxic adult male in no acute respiratory distress.  Making purposeful movements.  Speaking in full sentences.  Skin: No obvious rashes seen.  Good turgor.  No abrasions.  No ecchymoses.  Eyes: No conjunctival injection. EOMI, PERRL.  Head: NC/AT  Chest: CTAB. No increased work of breathing, bilateral chest rise.  Cardiovascular: Regular rate and rhythm.  Normal equal bilateral radial pulses.  Abdomen: Soft.  Not distended.   Nontender.  No guarding.  No rebound. No Masses  Musculoskeletal: No obvious deformities.   Neck supple, full range of motion, no obvious deformity.   No tenderness to palpation of cervical through lumbar spine.  No step-offs or deformities. Good range of motion all joints.  Neurologic: Moves all extremities and carries on conversation. CN- II: PERRL; III/IV/VI: EOMI w/out evidence of nystagmus; V: no deficits appreciated to light touch bilateral face; VII: no facial weakness, no facial asymmetry. Eyebrow raise symmetric. Smile symmetric; IX/X: palate midline, and raises symmetrically; XI: shoulder shrug 5/5 bilaterally; XII: tongue is midline w/out asymmetry. Strength 5/5 to bilateral upper and lower extremities, sensation intact to light touch. Gait normal.  Mental Status:  Alert and oriented x 3.  Appropriate, conversant.      Results and Medications    Procedures    Labs Reviewed   CBC W/ AUTO DIFFERENTIAL - Abnormal; Notable for the following components:       Result Value    RBC 4.38 (*)     Hemoglobin 13.8 (*)     MCH 31.5 (*)     All other components within normal limits   COMPREHENSIVE METABOLIC PANEL - Abnormal; Notable for the following components:    Glucose 117 (*)     All other components within normal limits   URINALYSIS, REFLEX TO URINE CULTURE - Abnormal; Notable for the following components:    Protein, UA Trace (*)     All other components within normal limits    Narrative:     Specimen Source->Urine   TSH - Abnormal; Notable for the following components:    TSH 8.361 (*)     All other components within normal limits   HIV 1 / 2 ANTIBODY    Narrative:     Release to patient->Immediate   HEPATITIS C ANTIBODY    Narrative:     Release to patient->Immediate   T4, FREE   POCT GLUCOSE   ISTAT PROCEDURE       Imaging Results    None             Medications   sodium chloride 0.9% bolus 1,000 mL 1,000 mL (0 mLs Intravenous Stopped 12/6/23 0006)   sodium chloride 0.9% bolus 1,000 mL 1,000 mL (0 mLs  Intravenous Stopped 12/6/23 0106)       MDM, Impression and Plan   Independently Interpreted Test(s):   EKG:  I independently reviewed and interpreted the EKG and my findings are as follows:   Detailed here or in ED course.     Clinical Tests:   Lab Tests: Ordered and Reviewed  Radiological Study: Ordered and Reviewed  Medical Tests: Ordered and Reviewed    Differential diagnosis:  -vasovagal syncope   -unlikely cardiac syncope based off complaints and history   -unlikely seizure-like activity based off of history  -unlikely infectious etiology based off of complaints and history   -ICH versus fracture versus dislocation based off of complaints and history    Initial Assessment & ED Management:    Urgent evaluation of 53 y.o. male with Chief complaint of syncope while at a concert.  Patient did note marijuana and alcohol use.  He underwent a dental procedure earlier this morning.  On arrival to the ED he is noted to be bradycardic with a soft blood pressure in no acute respiratory distress.  His heart rate spontaneously improved.  He was given 2 L of IV fluids.  Urinalysis without signs of UTI.  T4 within normal limits.  CMP and CMP grossly unremarkable.  Glucose within normal limits.  His heart rate improved in his blood pressure had great improvement.  Called to the room after patient ambulated with a steady gait.  He is requesting to leave.  He states that he feels that he is at his baseline.  He and his wife both state that he has a low resting heart rate and low normal blood pressures.  He has no ongoing symptoms.  His syncope sounds vasovagal in origin.  He was monitored on cardiac monitor without noted arrhythmias.  Care plan addressed with patient and all those present. All questions answered.  Strict return precautions discussed.  Patient was instructed on the correct follow-up time and route.  They voiced verbal understanding and agreement  with the plan and were deemed stable for discharge.       Medical  Decision Making  Amount and/or Complexity of Data Reviewed  Labs: ordered. Decision-making details documented in ED Course.           Please see ED course for discussion of workup and dispo if not listed above.          Final diagnoses:  [R55] Syncope        ED Disposition Condition    Discharge Stable          ED Prescriptions    None       Follow-up Information       Follow up With Specialties Details Why Contact Info    Lillian Marroquin MD Family Medicine In 2 days If symptoms worsen [address]  [address]  [facility] [address]  [phone]      [facility] - Emergency Dept Emergency Medicine  If symptoms worsen [address] [id number] [phone]    Adonay Palmer MD Interventional Cardiology, Nuclear Medicine, Cardiovascular Disease, Cardiology Schedule an appointment as soon as possible for a visit in 2 days Or the cardiologist of your choice [address]   [address]  [facility] [address]  [phone]              ED Course as of 12/06/23 0933   Tue Dec 05, 2023   2301 EKG shows sinus bradycardia with sinus arrhythmia and ventricular rate of 55 beats per minute.  Noted upsloping ST segments with slight peaking of T-waves.  No acute signs of ischemia.  Intervals otherwise within normal limits. [HM]   2340 Potassium, Blood Gas: 3.7 [HM]   Wed Dec 06, 2023   0120 Repeat EKG shows sinus bradycardia with ventricular rate of 59 beats per minute.  Narrow QRS.  No ST segment elevations or depressions.  No STEMI.  Intervals within normal limits.  No STEMI. [HM]      ED Course User Index  [HM] Roselia Cruz MD Heyward Mack, MD Mack, Heyward B, MD  12/06/23 0915

## 2023-12-06 NOTE — ED TRIAGE NOTES
"Arely Haro, a 53 y.o. male presents to the ED with wife after pt experienced syncopal episode while at concert tonight. Pt denies hx of syncopal episodes in the past. Pt felt faint and started to fall when bystanders caught him and lowered him to the ground. Both pt and wife deny injury/trauma. Pt wife reports pt had multiple episodes where his eyes rolled back in his head, had +LOC for 5 minutes. This repeated on/off for 20 minutes. Wife reports pt was pale. On arrival, pt is flushed and clammy.  Pt is AAOx4, GCS 15. Says he feels more like "normal". Reports drinking 1 beer and smoking marijuana tonight,  but says this is not abnormal for him.     Pt resting comfortably. Connected to cardiac monitor, BP cuff, and pulse oximeter. ED workup in progress. Call light within reach. Safety measures in place. Denies further needs. Plan of care ongoing. Wife at bedside.       Chief Complaint   Patient presents with    Loss of Consciousness     Reports syncopal episodes this evening, approx 40 mins PTA, patient was standing and caught by friends, while sitting patient had "eyes roll back in head", (+) LOC for several minutes. Currently, flush colored, clear speech. Did smoke marijuana and had 1 beer prior to incident, underwent dental work this am     Review of patient's allergies indicates:  No Known Allergies  Past Medical History:   Diagnosis Date    Asthma     Hemochromatosis      No past surgical history on file.    "

## 2024-02-28 ENCOUNTER — PATIENT MESSAGE (OUTPATIENT)
Dept: CARDIOLOGY | Facility: CLINIC | Age: 54
End: 2024-02-28

## 2024-02-28 ENCOUNTER — OFFICE VISIT (OUTPATIENT)
Dept: CARDIOLOGY | Facility: CLINIC | Age: 54
End: 2024-02-28
Payer: COMMERCIAL

## 2024-02-28 DIAGNOSIS — R55 SYNCOPE: Primary | ICD-10-CM

## 2024-02-28 DIAGNOSIS — Z82.49 FAMILY HISTORY OF EARLY CAD: ICD-10-CM

## 2024-02-28 DIAGNOSIS — R03.0 ELEVATED BLOOD-PRESSURE READING WITHOUT DIAGNOSIS OF HYPERTENSION: ICD-10-CM

## 2024-02-28 DIAGNOSIS — E78.5 DYSLIPIDEMIA: ICD-10-CM

## 2024-02-28 DIAGNOSIS — R55 VASOVAGAL SYNCOPE: ICD-10-CM

## 2024-02-28 PROCEDURE — 1159F MED LIST DOCD IN RCRD: CPT | Mod: CPTII,95,, | Performed by: INTERNAL MEDICINE

## 2024-02-28 PROCEDURE — 1160F RVW MEDS BY RX/DR IN RCRD: CPT | Mod: CPTII,95,, | Performed by: INTERNAL MEDICINE

## 2024-02-28 PROCEDURE — 99213 OFFICE O/P EST LOW 20 MIN: CPT | Mod: 95,,, | Performed by: INTERNAL MEDICINE

## 2024-02-28 RX ORDER — ATORVASTATIN CALCIUM 40 MG/1
40 TABLET, FILM COATED ORAL DAILY
Qty: 90 TABLET | Refills: 3 | Status: SHIPPED | OUTPATIENT
Start: 2024-02-28

## 2024-02-28 NOTE — PROGRESS NOTES
"Subjective:   Patient ID:  Arely Haro is a 53 y.o. male who presents for follow-up of CAD risk    HPI: The patient is here for CAD risk factors.   The patient has no chest pain, SOB, TIA, palpitations, syncope or pre-syncope.Patient currently exercises many times per week.    ER note  12/5/23 says"Pt presents with:   Loss of Consciousness (Reports syncopal episodes this evening, approx 40 mins PTA, patient was standing and caught by friends, while sitting patient had "eyes roll back in head", (+) LOC for several minutes. Currently, flush colored, clear speech. Did smoke marijuana and had 1 beer prior to incident, underwent dental work this am)        History Of Present Illness   Arely Haro is a 53 y.o. male with hyperlipidemia who presents to the ED with chief complaint of syncope 40 minutes prior to arrival.  Patient was in normal status of health.  He was at a [employer].  He states that he had a beer smoked marijuana.  He denies any other drug use.  He does note that he had a recent dental procedure which they use Novocain and earlier that day.  While at the concert this patient started to feel warm and felt lightheaded.  He states that he felt like he was going to pass out.  He walked outside of the theater and was caught by other concert guest.  He was assisted to the ground.  No head trauma, he was assisted to the ground.  He was then sat on a bench.  He was evaluated by a pediatric doctor, and ENT and an emergency nurse who noted that his heart rate was slightly low.  Wife stated that he seemed disoriented for approximately 20 minutes.  Patient states when he was in the car ride here he started to feel much better.  He denies any chest pain, shortness of breath, nausea, vomiting, diarrhea, headache, recent sick contacts or ongoing symptoms.  No seizure-like activity or seizure history.  No tongue biting loss of bowel or bladder tone.  Denies anticoagulation use."        Review of Systems "   Constitutional: Negative for chills, decreased appetite, diaphoresis, fever, malaise/fatigue, night sweats, weight gain and weight loss.   HENT:  Negative for congestion, hoarse voice, nosebleeds, sore throat and tinnitus.    Eyes:  Negative for blurred vision, double vision, vision loss in left eye, vision loss in right eye, visual disturbance and visual halos.   Cardiovascular:  Negative for chest pain, claudication, cyanosis, dyspnea on exertion, irregular heartbeat, leg swelling, near-syncope, orthopnea, palpitations, paroxysmal nocturnal dyspnea and syncope.   Respiratory:  Negative for cough, hemoptysis, shortness of breath, sleep disturbances due to breathing, snoring, sputum production and wheezing.    Endocrine: Negative for cold intolerance, heat intolerance, polydipsia, polyphagia and polyuria.   Hematologic/Lymphatic: Negative for adenopathy and bleeding problem. Does not bruise/bleed easily.   Skin:  Negative for color change, dry skin, flushing, itching, nail changes, poor wound healing, rash, skin cancer, suspicious lesions and unusual hair distribution.   Musculoskeletal:  Negative for arthritis, back pain, falls, gout, joint pain, joint swelling, muscle cramps, muscle weakness, myalgias and stiffness.   Gastrointestinal:  Negative for abdominal pain, anorexia, change in bowel habit, constipation, diarrhea, dysphagia, heartburn, hematemesis, hematochezia, melena and vomiting.   Genitourinary:  Negative for decreased libido, dysuria, hematuria, hesitancy and urgency.   Neurological:  Negative for excessive daytime sleepiness, dizziness, focal weakness, headaches, light-headedness, loss of balance, numbness, paresthesias, seizures, sensory change, tremors, vertigo and weakness.   Psychiatric/Behavioral:  Negative for altered mental status, depression, hallucinations, memory loss, substance abuse and suicidal ideas. The patient does not have insomnia and is not nervous/anxious.     Allergic/Immunologic: Negative for environmental allergies and hives.     ECG by me normal; recent labs bad he read to me  Objective: There were no vitals taken for this visit.     Physical Exam    Assessment:     1. Syncope    2. Elevated blood-pressure reading without diagnosis of hypertension    3. Family history of early CAD    4. Dyslipidemia    5. Vasovagal syncope        Plan:   Discussed diet , achieving and maintaining ideal body weight, and exercise.   We reviewed meds in detail.  Reassured-Discussed goals, options, plan.  Get labs. Lpa, HSCRP  Do CAC again close to 60  Could do CFD and 48 hour Holter but yield very low   EP if syncope recurrent  Switch Prava to Atorva half of 40 mg anytime day or night    Diagnoses and all orders for this visit:    Syncope  -     Ambulatory referral/consult to Cardiology    Elevated blood-pressure reading without diagnosis of hypertension  -     BNP; Future; Expected date: 05/15/2024  -     CRP, High Sensitivity; Future; Expected date: 05/15/2024    Family history of early CAD  -     Lipid Panel; Future; Expected date: 05/15/2024  -     Comprehensive Metabolic Panel; Future; Expected date: 05/15/2024  -     Lipoprotein A (LPA); Future; Expected date: 05/15/2024  -     BNP; Future; Expected date: 05/15/2024  -     CRP, High Sensitivity; Future; Expected date: 05/15/2024  -     atorvastatin (LIPITOR) 40 MG tablet; Take 1 tablet (40 mg total) by mouth once daily.    Dyslipidemia  -     Lipid Panel; Future; Expected date: 05/15/2024  -     Comprehensive Metabolic Panel; Future; Expected date: 05/15/2024  -     Lipoprotein A (LPA); Future; Expected date: 05/15/2024  -     BNP; Future; Expected date: 05/15/2024  -     CRP, High Sensitivity; Future; Expected date: 05/15/2024  -     atorvastatin (LIPITOR) 40 MG tablet; Take 1 tablet (40 mg total) by mouth once daily.    Vasovagal syncope  -     BNP; Future; Expected date: 05/15/2024  -     CRP, High Sensitivity; Future;  Expected date: 05/15/2024            Follow up for Labs mid to late May at [facility].

## 2024-02-28 NOTE — PATIENT INSTRUCTIONS
Discussed diet , achieving and maintaining ideal body weight, and exercise.   We reviewed meds in detail.  Reassured-Discussed goals, options, plan.  Get labs. Lpa, HSCRP  Do CAC again close to 60  Could do CFD and 48 hour Holter but yield very low   EP if syncope recurrent  Discussed diet , achieving and maintaining ideal body weight, and exercise.   We reviewed meds in detail.  Reassured-Discussed goals, options, plan.  Get labs. Lpa, HSCRP  Do CAC again close to 60  Could do CFD and 48 hour Holter but yield very low   EP if syncope recurrent

## 2024-05-13 ENCOUNTER — PATIENT MESSAGE (OUTPATIENT)
Dept: CARDIOLOGY | Facility: CLINIC | Age: 54
End: 2024-05-13
Payer: COMMERCIAL

## 2024-05-13 ENCOUNTER — LAB VISIT (OUTPATIENT)
Dept: LAB | Facility: OTHER | Age: 54
End: 2024-05-13
Attending: INTERNAL MEDICINE
Payer: COMMERCIAL

## 2024-05-13 DIAGNOSIS — E78.5 DYSLIPIDEMIA: ICD-10-CM

## 2024-05-13 DIAGNOSIS — R55 VASOVAGAL SYNCOPE: ICD-10-CM

## 2024-05-13 DIAGNOSIS — R03.0 ELEVATED BLOOD-PRESSURE READING WITHOUT DIAGNOSIS OF HYPERTENSION: ICD-10-CM

## 2024-05-13 DIAGNOSIS — Z82.49 FAMILY HISTORY OF EARLY CAD: ICD-10-CM

## 2024-05-13 LAB
ALBUMIN SERPL BCP-MCNC: 4.5 G/DL (ref 3.5–5.2)
ALP SERPL-CCNC: 90 U/L (ref 55–135)
ALT SERPL W/O P-5'-P-CCNC: 35 U/L (ref 10–44)
ANION GAP SERPL CALC-SCNC: 9 MMOL/L (ref 8–16)
AST SERPL-CCNC: 27 U/L (ref 10–40)
BILIRUB SERPL-MCNC: 1.2 MG/DL (ref 0.1–1)
BNP SERPL-MCNC: <10 PG/ML (ref 0–99)
BUN SERPL-MCNC: 13 MG/DL (ref 6–20)
CALCIUM SERPL-MCNC: 9.9 MG/DL (ref 8.7–10.5)
CHLORIDE SERPL-SCNC: 107 MMOL/L (ref 95–110)
CHOLEST SERPL-MCNC: 176 MG/DL (ref 120–199)
CHOLEST/HDLC SERPL: 2.6 {RATIO} (ref 2–5)
CO2 SERPL-SCNC: 25 MMOL/L (ref 23–29)
CREAT SERPL-MCNC: 1 MG/DL (ref 0.5–1.4)
CRP SERPL-MCNC: 0.71 MG/L (ref 0–3.19)
EST. GFR  (NO RACE VARIABLE): >60 ML/MIN/1.73 M^2
GLUCOSE SERPL-MCNC: 98 MG/DL (ref 70–110)
HDLC SERPL-MCNC: 67 MG/DL (ref 40–75)
HDLC SERPL: 38.1 % (ref 20–50)
LDLC SERPL CALC-MCNC: 98.6 MG/DL (ref 63–159)
NONHDLC SERPL-MCNC: 109 MG/DL
POTASSIUM SERPL-SCNC: 4.3 MMOL/L (ref 3.5–5.1)
PROT SERPL-MCNC: 8 G/DL (ref 6–8.4)
SODIUM SERPL-SCNC: 141 MMOL/L (ref 136–145)
TRIGL SERPL-MCNC: 52 MG/DL (ref 30–150)

## 2024-05-13 PROCEDURE — 80053 COMPREHEN METABOLIC PANEL: CPT | Performed by: INTERNAL MEDICINE

## 2024-05-13 PROCEDURE — 86141 C-REACTIVE PROTEIN HS: CPT | Performed by: INTERNAL MEDICINE

## 2024-05-13 PROCEDURE — 80061 LIPID PANEL: CPT | Performed by: INTERNAL MEDICINE

## 2024-05-13 PROCEDURE — 83880 ASSAY OF NATRIURETIC PEPTIDE: CPT | Performed by: INTERNAL MEDICINE

## 2024-05-13 PROCEDURE — 36415 COLL VENOUS BLD VENIPUNCTURE: CPT | Performed by: INTERNAL MEDICINE

## 2024-05-13 PROCEDURE — 83695 ASSAY OF LIPOPROTEIN(A): CPT | Performed by: INTERNAL MEDICINE

## 2024-05-13 NOTE — PROGRESS NOTES
Your results look fine and do not require any change in treatment unless Lpa high    Please contact me if you have any additional concerns.    Sincerely,    Perez Garcias

## 2024-05-16 ENCOUNTER — PATIENT MESSAGE (OUTPATIENT)
Dept: CARDIOLOGY | Facility: CLINIC | Age: 54
End: 2024-05-16
Payer: COMMERCIAL

## 2024-05-16 LAB — LPA SERPL-MCNC: 10 MG/DL (ref 0–30)

## 2024-05-16 NOTE — PROGRESS NOTES
Your results look fine and do not require any change in treatment.     Please contact me if you have any additional concerns.    Sincerely,    Perez Garcias

## 2025-02-28 DIAGNOSIS — E78.5 DYSLIPIDEMIA: ICD-10-CM

## 2025-02-28 DIAGNOSIS — Z82.49 FAMILY HISTORY OF EARLY CAD: ICD-10-CM

## 2025-03-02 RX ORDER — ATORVASTATIN CALCIUM 40 MG/1
40 TABLET, FILM COATED ORAL
Qty: 90 TABLET | Refills: 3 | Status: SHIPPED | OUTPATIENT
Start: 2025-03-02